# Patient Record
Sex: FEMALE | Race: BLACK OR AFRICAN AMERICAN | NOT HISPANIC OR LATINO | Employment: FULL TIME | URBAN - METROPOLITAN AREA
[De-identification: names, ages, dates, MRNs, and addresses within clinical notes are randomized per-mention and may not be internally consistent; named-entity substitution may affect disease eponyms.]

---

## 2021-08-08 LAB
ANION GAP SERPL CALCULATED.3IONS-SCNC: 10 MMOL/L (ref 10–20)
BASOPHILS # BLD: 0.02 X10E9/L (ref 0–0.1)
BASOPHILS RELATIVE PERCENT: 0.4 % (ref 0–2)
BICARBONATE: 26 MMOL/L (ref 21–32)
CALCIUM SERPL-MCNC: 8.1 MG/DL (ref 8.6–10.3)
CHLORIDE BLD-SCNC: 105 MMOL/L (ref 98–107)
CREAT SERPL-MCNC: 0.75 MG/DL (ref 0.5–1)
DATE OF SYMPTOM ONSET: NORMAL
EOSINOPHIL # BLD: 0.11 X10E9/L (ref 0–0.7)
EOSINOPHILS RELATIVE PERCENT: 2.1 % (ref 0–6)
ERYTHROCYTE [DISTWIDTH] IN BLOOD BY AUTOMATED COUNT: 15.9 % (ref 11.5–14)
GFR AFRICAN AMERICAN: >60 ML/MIN/1.73M2
GFR NON-AFRICAN AMERICAN: >60 ML/MIN/1.73M2
GLUCOSE: 84 MG/DL (ref 74–99)
HCT VFR BLD CALC: 31.7 % (ref 36–46)
HEMOGLOBIN: 9.8 G/DL (ref 12–16)
IMMATURE GRANULOCYTES %: 0.2 % (ref 0–0.9)
INR BLD: 1.2 (ref 0.9–1.1)
LYMPHOCYTES # BLD: 45.5 % (ref 13–44)
LYMPHOCYTES RELATIVE PERCENT: 2.42 X10E9/L (ref 1.2–4.8)
Lab: 1875 NG/ML FEU
MCHC RBC AUTO-ENTMCNC: 30.9 G/DL (ref 32–36)
MCV RBC AUTO: 76 FL (ref 80–100)
MONOCYTES # BLD: 0.34 X10E9/L (ref 0.1–1)
MONOCYTES RELATIVE PERCENT: 6.4 % (ref 2–10)
NEUTROPHILS RELATIVE PERCENT: 45.4 % (ref 40–80)
NEUTROPHILS: 2.42 X10E9/L (ref 1.2–7.7)
PLATELET # BLD: 337 X10E9/L (ref 150–450)
POTASSIUM SERPL-SCNC: 3.6 MMOL/L (ref 3.5–5.3)
PROTHROMBIN TIME: 14.2 SEC (ref 10.1–13)
RBC # BLD: 4.17 X10E12/L (ref 4–5.2)
SARS-COV-2, PCR: NOT DETECTED
SODIUM BLD-SCNC: 137 MMOL/L (ref 136–145)
SPECIMEN SOURCE: NORMAL
TROPONIN I: <0.02 NG/ML (ref 0–0)
UREA NITROGEN: 12 MG/DL (ref 6–23)
WBC: 5.3 X10E9/L (ref 4.4–11.3)

## 2023-03-05 PROBLEM — H69.93 EUSTACHIAN TUBE DYSFUNCTION, BILATERAL: Status: ACTIVE | Noted: 2023-03-05

## 2023-03-05 PROBLEM — H83.2X2 VESTIBULAR HYPOFUNCTION OF LEFT EAR: Status: ACTIVE | Noted: 2023-03-05

## 2023-03-05 PROBLEM — R51.9 HEADACHE: Status: ACTIVE | Noted: 2023-03-05

## 2023-03-05 PROBLEM — R09.82 POST-NASAL DRAINAGE: Status: ACTIVE | Noted: 2023-03-05

## 2023-03-05 PROBLEM — J30.9 ALLERGIC RHINITIS: Status: ACTIVE | Noted: 2023-03-05

## 2023-03-05 PROBLEM — D64.9 ANEMIA: Status: ACTIVE | Noted: 2023-03-05

## 2023-03-05 PROBLEM — E66.9 OBESITY (BMI 30-39.9): Status: ACTIVE | Noted: 2023-03-05

## 2023-03-05 PROBLEM — E87.6 HYPOKALEMIA: Status: ACTIVE | Noted: 2023-03-05

## 2023-03-05 PROBLEM — R39.9 UTI SYMPTOMS: Status: ACTIVE | Noted: 2023-03-05

## 2023-03-05 PROBLEM — J45.40 ASTHMA, MODERATE PERSISTENT (HHS-HCC): Status: ACTIVE | Noted: 2023-03-05

## 2023-03-05 PROBLEM — I31.39 PERICARDIAL EFFUSION (HHS-HCC): Status: ACTIVE | Noted: 2023-03-05

## 2023-03-05 PROBLEM — R42 DIZZINESS: Status: ACTIVE | Noted: 2023-03-05

## 2023-03-05 PROBLEM — M54.2 CERVICALGIA: Status: ACTIVE | Noted: 2023-03-05

## 2023-03-05 PROBLEM — K30 INDIGESTION: Status: ACTIVE | Noted: 2023-03-05

## 2023-03-05 PROBLEM — T78.1XXA ADVERSE FOOD REACTION: Status: ACTIVE | Noted: 2023-03-05

## 2023-03-05 PROBLEM — K21.9 GERD (GASTROESOPHAGEAL REFLUX DISEASE): Status: ACTIVE | Noted: 2023-03-05

## 2023-03-05 PROBLEM — Z97.5 IUD CONTRACEPTION: Status: ACTIVE | Noted: 2023-03-05

## 2023-03-05 PROBLEM — T78.40XA ALLERGIES: Status: ACTIVE | Noted: 2023-03-05

## 2023-03-05 PROBLEM — M54.81 OCCIPITAL NEURALGIA OF RIGHT SIDE: Status: ACTIVE | Noted: 2023-03-05

## 2023-03-05 PROBLEM — G43.E09 CHRONIC MIGRAINE WITH AURA: Status: ACTIVE | Noted: 2023-03-05

## 2023-03-05 PROBLEM — J34.3 HYPERTROPHY OF BOTH INFERIOR NASAL TURBINATES: Status: ACTIVE | Noted: 2023-03-05

## 2023-03-05 PROBLEM — R10.13 DYSPEPSIA: Status: ACTIVE | Noted: 2023-03-05

## 2023-03-05 PROBLEM — R63.2 POLYPHAGIA: Status: ACTIVE | Noted: 2023-03-05

## 2023-03-05 PROBLEM — H92.09 OTALGIA: Status: ACTIVE | Noted: 2023-03-05

## 2023-03-05 PROBLEM — J31.0 CHRONIC RHINITIS: Status: ACTIVE | Noted: 2023-03-05

## 2023-03-05 PROBLEM — R12 HEARTBURN: Status: ACTIVE | Noted: 2023-03-05

## 2023-03-05 PROBLEM — E88.810 METABOLIC SYNDROME: Status: ACTIVE | Noted: 2023-03-05

## 2023-03-05 PROBLEM — H92.09 OTALGIA: Status: RESOLVED | Noted: 2023-03-05 | Resolved: 2023-03-05

## 2023-03-05 PROBLEM — N39.0 UTI (URINARY TRACT INFECTION): Status: ACTIVE | Noted: 2023-03-05

## 2023-03-05 PROBLEM — J34.2 NASAL SEPTAL DEVIATION: Status: ACTIVE | Noted: 2023-03-05

## 2023-03-05 PROBLEM — F41.1 ANXIETY STATE (NEUROTIC): Status: ACTIVE | Noted: 2023-03-05

## 2023-03-05 PROBLEM — R73.03 PREDIABETES: Status: ACTIVE | Noted: 2023-03-05

## 2023-03-05 PROBLEM — R06.83 SNORING: Status: ACTIVE | Noted: 2023-03-05

## 2023-03-05 PROBLEM — R10.13 EPIGASTRIC PAIN: Status: ACTIVE | Noted: 2023-03-05

## 2023-03-05 PROBLEM — H92.03 OTALGIA OF BOTH EARS: Status: ACTIVE | Noted: 2023-03-05

## 2023-03-05 PROBLEM — H81.392 OTHER PERIPHERAL VERTIGO, LEFT EAR: Status: ACTIVE | Noted: 2023-03-05

## 2023-03-05 RX ORDER — AZELASTINE 1 MG/ML
2 SPRAY, METERED NASAL 2 TIMES DAILY
COMMUNITY
Start: 2020-12-08 | End: 2023-07-10 | Stop reason: ALTCHOICE

## 2023-03-05 RX ORDER — FLUTICASONE PROPIONATE AND SALMETEROL 250; 50 UG/1; UG/1
1 POWDER RESPIRATORY (INHALATION) 2 TIMES DAILY
COMMUNITY
Start: 2022-09-26 | End: 2024-04-05 | Stop reason: SDUPTHER

## 2023-03-05 RX ORDER — FREMANEZUMAB-VFRM 225 MG/1.5ML
225 INJECTION SUBCUTANEOUS
COMMUNITY
End: 2023-07-10 | Stop reason: ALTCHOICE

## 2023-03-05 RX ORDER — TRIAMCINOLONE ACETONIDE 1 MG/G
1 OINTMENT TOPICAL
COMMUNITY
End: 2023-07-10 | Stop reason: ALTCHOICE

## 2023-03-05 RX ORDER — DIHYDROERGOTAMINE MESYLATE 4 MG/ML
1 SPRAY, METERED NASAL DAILY
COMMUNITY
End: 2024-02-14 | Stop reason: WASHOUT

## 2023-03-05 RX ORDER — BUDESONIDE AND FORMOTEROL FUMARATE DIHYDRATE 160; 4.5 UG/1; UG/1
1 AEROSOL RESPIRATORY (INHALATION)
COMMUNITY
Start: 2022-08-17 | End: 2024-04-05 | Stop reason: SDUPTHER

## 2023-03-05 RX ORDER — TRETINOIN 1 MG/G
1 CREAM TOPICAL NIGHTLY
COMMUNITY
Start: 2020-06-18

## 2023-03-05 RX ORDER — DULAGLUTIDE 1.5 MG/.5ML
1.5 INJECTION, SOLUTION SUBCUTANEOUS
COMMUNITY
End: 2024-04-16 | Stop reason: WASHOUT

## 2023-03-05 RX ORDER — IPRATROPIUM BROMIDE 17 UG/1
2 AEROSOL, METERED RESPIRATORY (INHALATION) 4 TIMES DAILY
COMMUNITY
Start: 2022-07-10 | End: 2024-05-15 | Stop reason: SDUPTHER

## 2023-03-05 RX ORDER — OMEPRAZOLE 40 MG/1
1 CAPSULE, DELAYED RELEASE ORAL 2 TIMES DAILY
COMMUNITY
Start: 2022-10-05 | End: 2023-07-10 | Stop reason: ALTCHOICE

## 2023-03-05 RX ORDER — ALBUTEROL SULFATE 90 UG/1
2 AEROSOL, METERED RESPIRATORY (INHALATION) EVERY 4 HOURS PRN
COMMUNITY
End: 2024-04-05 | Stop reason: SDUPTHER

## 2023-03-05 RX ORDER — IPRATROPIUM BROMIDE AND ALBUTEROL SULFATE 2.5; .5 MG/3ML; MG/3ML
3 SOLUTION RESPIRATORY (INHALATION)
COMMUNITY
Start: 2021-08-25

## 2023-03-05 RX ORDER — RIZATRIPTAN BENZOATE 10 MG/1
1 TABLET, ORALLY DISINTEGRATING ORAL AS NEEDED
COMMUNITY
Start: 2020-12-09

## 2023-03-06 ENCOUNTER — APPOINTMENT (OUTPATIENT)
Dept: PRIMARY CARE | Facility: CLINIC | Age: 33
End: 2023-03-06
Payer: MEDICAID

## 2023-03-07 ENCOUNTER — APPOINTMENT (OUTPATIENT)
Dept: PRIMARY CARE | Facility: CLINIC | Age: 33
End: 2023-03-07
Payer: MEDICAID

## 2023-03-09 ENCOUNTER — APPOINTMENT (OUTPATIENT)
Dept: PRIMARY CARE | Facility: CLINIC | Age: 33
End: 2023-03-09
Payer: MEDICAID

## 2023-03-13 ENCOUNTER — TELEPHONE (OUTPATIENT)
Dept: PRIMARY CARE | Facility: CLINIC | Age: 33
End: 2023-03-13

## 2023-03-14 LAB
ALLERGEN ANIMAL: CAT DANDER IGE (KU/L): <0.1 KU/L
ALLERGEN ANIMAL: DOG DANDER IGE (KU/L): <0.1 KU/L
ALLERGEN GRASS: BERMUDA GRASS (CYNODON DACTYLON) IGE (KU/L): <0.1 KU/L
ALLERGEN GRASS: JOHNSON GRASS (SORGHUM HALEPENSE) IGE (KU/L): <0.1 KU/L
ALLERGEN GRASS: MEADOW GRASS, KENTUCKY BLUE (POA PRATENSIS )IGE (KU/L): <0.1 KU/L
ALLERGEN GRASS: TIMOTHY GRASS (PHLEUM PRATENSE) IGE (KU/L): <0.1 KU/L
ALLERGEN INSECT: COCKROACH IGE: <0.1 KU/L
ALLERGEN MICROORGANISM: ALTERNARIA ALTERNATA IGE (KU/L): <0.1 KU/L
ALLERGEN MICROORGANISM: ASPERGILLUS FUMIGATUS IGE (KU/L): <0.1 KU/L
ALLERGEN MICROORGANISM: CLADOSPORIUM HERBARUM IGE (KU/L): <0.1 KU/L
ALLERGEN MICROORGANISM: PENICILLIUM CHRYSOGENUM (P. NOTATUM) IGE (KU/L): <0.1 KU/L
ALLERGEN MITE: DERMATOPHAGOIDES FARINAE (HOUSE DUST MITE) IGE (KU/L): <0.1 KU/L
ALLERGEN MITE: DERMATOPHAGOIDES PTERONYSSINUS (HOUSE DUST MITE) IGE (KU/L): <0.1 KU/L
ALLERGEN TREE: BOX-ELDER (ACER NEGUNDO) IGE (KU/L): <0.1 KU/L
ALLERGEN TREE: COMMON SILVER BIRCH (BETULA VERRUCOSA) IGE (KU/L): <0.1 KU/L
ALLERGEN TREE: COTTONWOOD (POPULUS DELTOIDES) IGE (KU/L): <0.1 KU/L
ALLERGEN TREE: ELM (ULMUS AMERICANA) IGE (KU/L): <0.1 KU/L
ALLERGEN TREE: MAPLE LEAF SYCAMORE, LONDON PLANE IGE (KU/L): <0.1 KU/L
ALLERGEN TREE: MOUNTAIN JUNIPER (JUNIPERUS SABINOIDES) IGE (KU/L): 0.14 KU/L
ALLERGEN TREE: MULBERRY (MORUS ALBA) IGE (KU/L): <0.1 KU/L
ALLERGEN TREE: OAK (QUERCUS ALBA) IGE (KU/L): <0.1 KU/L
ALLERGEN TREE: PECAN, HICKORY (CARYA PECAN) IGE (KU/L): <0.1 KU/L
ALLERGEN TREE: WALNUT IGE: <0.1 KU/L
ALLERGEN TREE: WHITE ASH (FRAXINUS AMERICANA) IGE (KU/L): <0.1 KU/L
ALLERGEN WEED: COMMON PIGWEED (AMARANTHUS RETROFLEXUS) IGE (KU/L): <0.1 KU/L
ALLERGEN WEED: COMMON RAGWEED (AMB. ARTEMISIIFOLIA/A. ELATIOR) IGE (KU/L): 2.76 KU/L
ALLERGEN WEED: GOOSEFOOT, LAMB'S QUARTERS (CHENOPODIUM ALBUM) IGE (KU/L): <0.1 KU/L
ALLERGEN WEED: PLANTAIN (ENGLISH), RIBWORT (PLANTAGO LANCEOLATA) IGE (KU/L): <0.1 KU/L
ALLERGEN WEED: PRICKLY SALTWORT/RUSSIAN THISTLE (SALSOLA KALI) IGE (KU/L): <0.1 KU/L
ALLERGEN WEED: SHEEP SORREL (RUMEX ACETOSELLA) IGE (KU/L): <0.1 KU/L
IMMUNOCAP IGE: 14.9 KU/L (ref 0–214)
IMMUNOCAP INTERPRETATION: ABNORMAL

## 2023-03-16 LAB
IMMUNOCAP INTERPRETATION (ARUP): NORMAL
Lab: <0.1 KU/L

## 2023-03-18 LAB
MISCELLANEUOUS TEST RESULT: NORMAL
NAME OF SENDOUT TEST: NORMAL

## 2023-04-13 ENCOUNTER — APPOINTMENT (OUTPATIENT)
Dept: PRIMARY CARE | Facility: CLINIC | Age: 33
End: 2023-04-13
Payer: MEDICAID

## 2023-05-04 ENCOUNTER — TELEPHONE (OUTPATIENT)
Dept: PRIMARY CARE | Facility: CLINIC | Age: 33
End: 2023-05-04
Payer: MEDICAID

## 2023-05-04 DIAGNOSIS — R51.9 NONINTRACTABLE HEADACHE, UNSPECIFIED CHRONICITY PATTERN, UNSPECIFIED HEADACHE TYPE: Primary | ICD-10-CM

## 2023-05-04 NOTE — TELEPHONE ENCOUNTER
Patient states she tested positive for Covid 19 today. Patient would would like to get medications called into her pharmacy.

## 2023-05-11 ENCOUNTER — APPOINTMENT (OUTPATIENT)
Dept: PRIMARY CARE | Facility: CLINIC | Age: 33
End: 2023-05-11
Payer: MEDICAID

## 2023-05-11 DIAGNOSIS — E88.810 METABOLIC SYNDROME: Primary | ICD-10-CM

## 2023-05-16 ENCOUNTER — LAB (OUTPATIENT)
Dept: LAB | Facility: LAB | Age: 33
End: 2023-05-16
Payer: MEDICAID

## 2023-05-16 DIAGNOSIS — E88.810 METABOLIC SYNDROME: ICD-10-CM

## 2023-05-16 LAB
ANION GAP IN SER/PLAS: 12 MMOL/L (ref 10–20)
CALCIUM (MG/DL) IN SER/PLAS: 9.2 MG/DL (ref 8.6–10.6)
CARBON DIOXIDE, TOTAL (MMOL/L) IN SER/PLAS: 27 MMOL/L (ref 21–32)
CHLORIDE (MMOL/L) IN SER/PLAS: 105 MMOL/L (ref 98–107)
CHOLESTEROL (MG/DL) IN SER/PLAS: 184 MG/DL (ref 0–199)
CHOLESTEROL IN HDL (MG/DL) IN SER/PLAS: 43.5 MG/DL
CHOLESTEROL/HDL RATIO: 4.2
CREATININE (MG/DL) IN SER/PLAS: 0.79 MG/DL (ref 0.5–1.05)
ESTIMATED AVERAGE GLUCOSE FOR HBA1C: 108 MG/DL
GFR FEMALE: >90 ML/MIN/1.73M2
GLUCOSE (MG/DL) IN SER/PLAS: 82 MG/DL (ref 74–99)
HEMOGLOBIN A1C/HEMOGLOBIN TOTAL IN BLOOD: 5.4 %
LDL: 124 MG/DL (ref 0–99)
POTASSIUM (MMOL/L) IN SER/PLAS: 3.8 MMOL/L (ref 3.5–5.3)
SODIUM (MMOL/L) IN SER/PLAS: 140 MMOL/L (ref 136–145)
TRIGLYCERIDE (MG/DL) IN SER/PLAS: 84 MG/DL (ref 0–149)
UREA NITROGEN (MG/DL) IN SER/PLAS: 12 MG/DL (ref 6–23)
VLDL: 17 MG/DL (ref 0–40)

## 2023-05-16 PROCEDURE — 83036 HEMOGLOBIN GLYCOSYLATED A1C: CPT

## 2023-05-16 PROCEDURE — 80048 BASIC METABOLIC PNL TOTAL CA: CPT

## 2023-05-16 PROCEDURE — 36415 COLL VENOUS BLD VENIPUNCTURE: CPT

## 2023-05-16 PROCEDURE — 80061 LIPID PANEL: CPT

## 2023-06-29 ENCOUNTER — TELEPHONE (OUTPATIENT)
Dept: PRIMARY CARE | Facility: CLINIC | Age: 33
End: 2023-06-29

## 2023-06-29 NOTE — TELEPHONE ENCOUNTER
Patient is requesting a script for Zyrtec for allergies she stated she's having sinus pressure and headaches.

## 2023-07-10 ENCOUNTER — OFFICE VISIT (OUTPATIENT)
Dept: PRIMARY CARE | Facility: CLINIC | Age: 33
End: 2023-07-10
Payer: MEDICAID

## 2023-07-10 VITALS
BODY MASS INDEX: 36.72 KG/M2 | WEIGHT: 194.47 LBS | HEART RATE: 104 BPM | OXYGEN SATURATION: 99 % | TEMPERATURE: 98 F | HEIGHT: 61 IN | DIASTOLIC BLOOD PRESSURE: 88 MMHG | SYSTOLIC BLOOD PRESSURE: 128 MMHG

## 2023-07-10 DIAGNOSIS — J34.2 NASAL SEPTAL DEVIATION: ICD-10-CM

## 2023-07-10 DIAGNOSIS — R22.1 NECK MASS: Primary | ICD-10-CM

## 2023-07-10 DIAGNOSIS — Z71.2 ENCOUNTER TO DISCUSS TEST RESULTS: ICD-10-CM

## 2023-07-10 DIAGNOSIS — J30.9 ALLERGIC RHINITIS, UNSPECIFIED SEASONALITY, UNSPECIFIED TRIGGER: ICD-10-CM

## 2023-07-10 PROBLEM — H92.09 OTALGIA: Status: ACTIVE | Noted: 2023-07-10

## 2023-07-10 PROBLEM — S16.1XXA CERVICAL STRAIN: Status: ACTIVE | Noted: 2018-05-03

## 2023-07-10 PROBLEM — G43.909 MIGRAINE: Status: ACTIVE | Noted: 2023-03-05

## 2023-07-10 PROBLEM — R29.3 POOR POSTURE: Status: ACTIVE | Noted: 2023-07-10

## 2023-07-10 PROBLEM — R20.2 NUMBNESS AND TINGLING OF LEFT SIDE OF FACE: Status: ACTIVE | Noted: 2021-12-07
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
## 2023-07-10 PROBLEM — B34.9 NONSPECIFIC SYNDROME SUGGESTIVE OF VIRAL ILLNESS: Status: ACTIVE | Noted: 2023-07-10

## 2023-07-10 PROBLEM — O09.899: Status: ACTIVE | Noted: 2017-10-18

## 2023-07-10 PROBLEM — R20.0 NUMBNESS AND TINGLING OF LEFT SIDE OF FACE: Status: ACTIVE | Noted: 2021-12-07

## 2023-07-10 PROBLEM — H69.90 EUSTACHIAN TUBE DYSFUNCTION: Status: ACTIVE | Noted: 2023-07-10

## 2023-07-10 PROBLEM — G43.709 CHRONIC MIGRAINE W/O AURA, NOT INTRACTABLE, W/O STAT MIGR: Status: ACTIVE | Noted: 2022-03-30

## 2023-07-10 PROBLEM — E66.9 OBESITY, CLASS II, BMI 35-39.9: Status: ACTIVE | Noted: 2021-07-21

## 2023-07-10 PROBLEM — O09.93 SUPERVISION OF HIGH RISK PREGNANCY IN THIRD TRIMESTER (HHS-HCC): Status: ACTIVE | Noted: 2017-10-18

## 2023-07-10 PROBLEM — E66.812 OBESITY, CLASS II, BMI 35-39.9: Status: ACTIVE | Noted: 2021-07-21

## 2023-07-10 PROCEDURE — 1036F TOBACCO NON-USER: CPT | Performed by: INTERNAL MEDICINE

## 2023-07-10 PROCEDURE — 99213 OFFICE O/P EST LOW 20 MIN: CPT | Performed by: INTERNAL MEDICINE

## 2023-07-10 RX ORDER — BENZOYL PEROXIDE 50 MG/ML
LIQUID TOPICAL
COMMUNITY

## 2023-07-10 RX ORDER — FLUTICASONE PROPIONATE 50 MCG
1 SPRAY, SUSPENSION (ML) NASAL DAILY
COMMUNITY
Start: 2023-05-27 | End: 2023-07-10 | Stop reason: SDUPTHER

## 2023-07-10 RX ORDER — KETOCONAZOLE 20 MG/ML
SHAMPOO, SUSPENSION TOPICAL
COMMUNITY
Start: 2023-04-19

## 2023-07-10 RX ORDER — DICYCLOMINE HYDROCHLORIDE 20 MG/1
1 TABLET ORAL 4 TIMES DAILY
COMMUNITY
Start: 2019-02-12 | End: 2023-07-10 | Stop reason: ALTCHOICE

## 2023-07-10 RX ORDER — MECLIZINE HCL 12.5 MG 12.5 MG/1
1 TABLET ORAL
COMMUNITY
Start: 2022-09-27

## 2023-07-10 RX ORDER — IRON PS COMPLEX/B12/FOLIC ACID 150-25-1
CAPSULE ORAL
COMMUNITY
Start: 2021-04-15 | End: 2024-04-05 | Stop reason: SDUPTHER

## 2023-07-10 RX ORDER — TRANEXAMIC ACID 650 MG/1
1300 TABLET ORAL 3 TIMES DAILY
COMMUNITY
Start: 2019-11-27

## 2023-07-10 RX ORDER — CLINDAMYCIN PHOSPHATE 11.9 MG/ML
SOLUTION TOPICAL
COMMUNITY
Start: 2019-02-26 | End: 2024-04-05 | Stop reason: SDUPTHER

## 2023-07-10 RX ORDER — ACETAMINOPHEN 500 MG
500 TABLET ORAL EVERY 6 HOURS PRN
COMMUNITY
Start: 2018-11-23

## 2023-07-10 RX ORDER — FLUTICASONE PROPIONATE 50 MCG
1 SPRAY, SUSPENSION (ML) NASAL DAILY
Qty: 16 G | Refills: 1 | Status: SHIPPED | OUTPATIENT
Start: 2023-07-10 | End: 2024-04-05 | Stop reason: SDUPTHER

## 2023-07-10 RX ORDER — CLOBETASOL PROPIONATE 0.5 MG/G
OINTMENT TOPICAL
COMMUNITY
Start: 2023-04-19

## 2023-07-10 RX ORDER — HYDROCORTISONE 25 MG/G
OINTMENT TOPICAL
COMMUNITY
Start: 2019-05-10

## 2023-07-10 ASSESSMENT — ENCOUNTER SYMPTOMS
SORE THROAT: 0
PALPITATIONS: 0
FEVER: 0
FATIGUE: 0
TROUBLE SWALLOWING: 0
WHEEZING: 0
RHINORRHEA: 0
FACIAL SWELLING: 0
COUGH: 0
CHILLS: 0
VOICE CHANGE: 0
PSYCHIATRIC NEGATIVE: 1
HEADACHES: 0
CHOKING: 0
VOMITING: 0
NAUSEA: 0
ARTHRALGIAS: 0
SHORTNESS OF BREATH: 0

## 2023-07-10 NOTE — PATIENT INSTRUCTIONS
Alberto was seen today for labs only.  Diagnoses and all orders for this visit:  Neck mass (Primary)  Comments:  No erythema or tenderness present of examination  Orders:  -     CT soft tissue neck w and wo IV contrast; Future  -     Referral to ENT; Future  Nasal septal deviation  Comments:  medication refill requested  Allergic rhinitis, unspecified seasonality, unspecified trigger  Comments:  medication refill requested  Orders:  -     fluticasone (Flonase) 50 mcg/actuation nasal spray; Administer 1 spray into each nostril once daily. shake liquid  Encounter to discuss test results  Comments:  Requested lab results reviewed discussed. LDL cholesterol was mildly elevated  Plan: Regular exercise  -Cholesterol lowering diet  -Continue weight reduction

## 2023-07-10 NOTE — PROGRESS NOTES
Subjective   Patient ID: Alberto Gerard is a 33 y.o. female who presents for Labs Only (Knot on neck).  The patient is a 32YO female who states that she noticed a non-tender lump on the back of her neck appx 2 mths ago. There has been no change in size of the mass.  She is also requesting refill of her RX for her nasal spray.        Review of Systems   Constitutional:  Negative for chills, fatigue and fever.   HENT:  Negative for congestion, ear discharge, facial swelling, rhinorrhea, sore throat, trouble swallowing and voice change.    Respiratory:  Negative for cough, choking, shortness of breath and wheezing.    Cardiovascular:  Negative for chest pain, palpitations and leg swelling.   Gastrointestinal:  Negative for nausea and vomiting.   Musculoskeletal:  Negative for arthralgias.   Neurological:  Negative for headaches (Hx of Migraine headache. No current SXs.).   Psychiatric/Behavioral: Negative.         Objective   Physical Exam  Vitals reviewed.   Constitutional:       General: She is not in acute distress.     Appearance: She is not ill-appearing.   HENT:      Mouth/Throat:      Mouth: Mucous membranes are moist.      Pharynx: Oropharynx is clear.   Neck:      Comments: There is an appx 3cm non-tender mass of the posterior neck area. No erythema.  Cardiovascular:      Rate and Rhythm: Regular rhythm.      Heart sounds: Normal heart sounds.   Pulmonary:      Effort: Pulmonary effort is normal.      Breath sounds: Normal breath sounds.   Abdominal:      General: Bowel sounds are normal.      Palpations: Abdomen is soft.   Musculoskeletal:      Cervical back: Normal range of motion and neck supple. No tenderness.   Lymphadenopathy:      Cervical: No cervical adenopathy.   Neurological:      Mental Status: She is alert.         Assessment/Plan   Alberto was seen today for labs only.  Diagnoses and all orders for this visit:  Neck mass (Primary)  Comments:  No erythema or tenderness present of  examination  Orders:  -     CT soft tissue neck w and wo IV contrast; Future  -     Referral to ENT; Future  Nasal septal deviation  Comments:  medication refill requested  Allergic rhinitis, unspecified seasonality, unspecified trigger  Comments:  medication refill requested  Orders:  -     fluticasone (Flonase) 50 mcg/actuation nasal spray; Administer 1 spray into each nostril once daily. shake liquid  Encounter to discuss test results  Comments:  Requested lab results reviewed discussed. LDL cholesterol was mildly elevated  Plan: Regular exercise  -Cholesterol lowering diet  -Continue weight reduction

## 2023-08-04 ENCOUNTER — TELEPHONE (OUTPATIENT)
Dept: PRIMARY CARE | Facility: CLINIC | Age: 33
End: 2023-08-04

## 2023-08-04 NOTE — TELEPHONE ENCOUNTER
Radiology called and requested a new order be sent in for the following:  Soft tissue and neck CT    Please place order and advise

## 2023-08-23 ENCOUNTER — OFFICE VISIT (OUTPATIENT)
Dept: PRIMARY CARE | Facility: CLINIC | Age: 33
End: 2023-08-23
Payer: MEDICAID

## 2023-08-23 VITALS
HEART RATE: 96 BPM | BODY MASS INDEX: 36.27 KG/M2 | HEIGHT: 61 IN | RESPIRATION RATE: 16 BRPM | SYSTOLIC BLOOD PRESSURE: 98 MMHG | TEMPERATURE: 97.6 F | WEIGHT: 192.1 LBS | DIASTOLIC BLOOD PRESSURE: 78 MMHG

## 2023-08-23 DIAGNOSIS — M54.41 BILATERAL LOW BACK PAIN WITH BILATERAL SCIATICA, UNSPECIFIED CHRONICITY: Primary | ICD-10-CM

## 2023-08-23 DIAGNOSIS — M54.42 BILATERAL LOW BACK PAIN WITH BILATERAL SCIATICA, UNSPECIFIED CHRONICITY: Primary | ICD-10-CM

## 2023-08-23 PROBLEM — E87.6 HYPOKALEMIA: Status: RESOLVED | Noted: 2023-03-05 | Resolved: 2023-08-23

## 2023-08-23 PROCEDURE — 1036F TOBACCO NON-USER: CPT | Performed by: INTERNAL MEDICINE

## 2023-08-23 PROCEDURE — 99214 OFFICE O/P EST MOD 30 MIN: CPT | Performed by: INTERNAL MEDICINE

## 2023-08-23 RX ORDER — IBUPROFEN 600 MG/1
600 TABLET ORAL 3 TIMES DAILY PRN
Qty: 90 TABLET | Refills: 0 | Status: SHIPPED | OUTPATIENT
Start: 2023-08-23 | End: 2023-11-21

## 2023-08-23 RX ORDER — IBUPROFEN 200 MG
TABLET ORAL
COMMUNITY
Start: 2023-04-15

## 2023-08-23 RX ORDER — GALCANEZUMAB 120 MG/ML
INJECTION, SOLUTION SUBCUTANEOUS
COMMUNITY
Start: 2023-08-03 | End: 2024-02-14 | Stop reason: WASHOUT

## 2023-08-23 ASSESSMENT — LIFESTYLE VARIABLES
HOW MANY STANDARD DRINKS CONTAINING ALCOHOL DO YOU HAVE ON A TYPICAL DAY: 1 OR 2
AUDIT-C TOTAL SCORE: 1
HOW OFTEN DO YOU HAVE SIX OR MORE DRINKS ON ONE OCCASION: NEVER
HOW OFTEN DO YOU HAVE A DRINK CONTAINING ALCOHOL: MONTHLY OR LESS
SKIP TO QUESTIONS 9-10: 1

## 2023-08-23 ASSESSMENT — ENCOUNTER SYMPTOMS
DYSURIA: 0
ABDOMINAL PAIN: 0
DIZZINESS: 0
HEADACHES: 0
NUMBNESS: 0
COUGH: 0
SHORTNESS OF BREATH: 0
BACK PAIN: 1
CONSTITUTIONAL NEGATIVE: 1
JOINT SWELLING: 0
WHEEZING: 0

## 2023-08-23 ASSESSMENT — PATIENT HEALTH QUESTIONNAIRE - PHQ9
SUM OF ALL RESPONSES TO PHQ9 QUESTIONS 1 & 2: 0
1. LITTLE INTEREST OR PLEASURE IN DOING THINGS: NOT AT ALL
2. FEELING DOWN, DEPRESSED OR HOPELESS: NOT AT ALL

## 2023-08-23 NOTE — PATIENT INSTRUCTIONS
Assessment/Plan   Alberto was seen today for leg pain.  Diagnoses and all orders for this visit:  Bilateral low back pain with bilateral sciatica, unspecified chronicity (Primary)  Comments:  There were no neurological deficits present on exam.  Orders:  -     XR lumbar spine 4+ views w flexion extension; Future  -     Referral to Medical Spine; Future  -     ibuprofen 600 mg tablet; Take 1 tablet (600 mg) by mouth 3 times a day as needed for mild pain (1 - 3) (pain. Take with food.).     Call or return for evaluation if symptoms worsen or fail to improve.

## 2023-08-23 NOTE — PROGRESS NOTES
Subjective   Patient ID: Alberto Gerard is a 33 y.o. female who presents for Leg Pain (Patient has achy, burning pain in both legs for 3 weeks.).  Was evaluated in [facility] 3 mths ago for pain in both legs. Her evaluation for DVT was negative.  Her current symptoms began appx three weeks ago and consists of low back pain with intermittent radicular pain to both lower extremities alternately.  Occasionally the pain radiates to both lower extremities at the same time. She denies extremity weakness or numbness, bladder or bowel incontinence.         Review of Systems   Constitutional: Negative.    HENT: Negative.     Respiratory:  Negative for cough, shortness of breath and wheezing.    Cardiovascular:  Negative for chest pain and leg swelling.   Gastrointestinal:  Negative for abdominal pain.   Genitourinary:  Negative for dysuria.   Musculoskeletal:  Positive for back pain. Negative for joint swelling.   Neurological:  Negative for dizziness, numbness and headaches.       Objective   Physical Exam  Vitals reviewed.   Constitutional:       Appearance: Normal appearance. She is obese.   Cardiovascular:      Rate and Rhythm: Regular rhythm.      Heart sounds: Normal heart sounds.   Pulmonary:      Effort: Pulmonary effort is normal.      Breath sounds: Normal breath sounds.   Abdominal:      General: Bowel sounds are normal.      Palpations: Abdomen is soft.   Musculoskeletal:      Cervical back: Normal range of motion and neck supple.   Neurological:      General: No focal deficit present.      Mental Status: She is alert and oriented to person, place, and time.      Sensory: No sensory deficit.      Motor: No weakness.      Gait: Gait normal.      Deep Tendon Reflexes: Reflexes normal.   Psychiatric:         Mood and Affect: Mood normal.         Behavior: Behavior normal.         Assessment/Plan   Alberto was seen today for leg pain.  Diagnoses and all orders for this visit:  Bilateral low back pain with bilateral  sciatica, unspecified chronicity (Primary)  Comments:  There were no neurological deficits present on exam.  Orders:  -     XR lumbar spine 4+ views w flexion extension; Future  -     Referral to Medical Spine; Future  -     ibuprofen 600 mg tablet; Take 1 tablet (600 mg) by mouth 3 times a day as needed for mild pain (1 - 3) (pain. Take with food.).     Call or return for evaluation if symptoms worsen or fail to improve.

## 2023-09-11 RX ORDER — METOPROLOL TARTRATE 25 MG/1
1-2 TABLET, FILM COATED ORAL EVERY 6 HOURS PRN
COMMUNITY
Start: 2023-09-08

## 2023-10-25 ENCOUNTER — TELEPHONE (OUTPATIENT)
Dept: CARDIOLOGY | Facility: CLINIC | Age: 33
End: 2023-10-25
Payer: MEDICAID

## 2023-10-25 DIAGNOSIS — I31.39 PERICARDIAL EFFUSION (HHS-HCC): Primary | ICD-10-CM

## 2023-10-25 DIAGNOSIS — I47.11 INAPPROPRIATE SINUS TACHYCARDIA (CMS-HCC): ICD-10-CM

## 2023-10-25 NOTE — TELEPHONE ENCOUNTER
Pt called in stating she has been experiencing Tachycardia for the last 3 days and she would like to know if she could do a stress test per your recommendation if she was still experiencing symptoms.    LOV 9/2023    Luna Villarreal MA

## 2023-11-01 NOTE — TELEPHONE ENCOUNTER
Call and let them know to schedule stress test  Usual follow up for testing.  (* 1/2 weeks after completion

## 2023-11-24 ENCOUNTER — APPOINTMENT (OUTPATIENT)
Dept: CARDIOLOGY | Facility: HOSPITAL | Age: 33
End: 2023-11-24
Payer: MEDICAID

## 2023-12-20 ENCOUNTER — HOSPITAL ENCOUNTER (OUTPATIENT)
Dept: CARDIOLOGY | Facility: CLINIC | Age: 33
Discharge: HOME | End: 2023-12-20
Payer: MEDICAID

## 2023-12-20 VITALS — SYSTOLIC BLOOD PRESSURE: 128 MMHG | DIASTOLIC BLOOD PRESSURE: 76 MMHG

## 2023-12-20 DIAGNOSIS — I31.39 PERICARDIAL EFFUSION (HHS-HCC): ICD-10-CM

## 2023-12-20 DIAGNOSIS — I47.11 INAPPROPRIATE SINUS TACHYCARDIA (CMS-HCC): ICD-10-CM

## 2023-12-20 DIAGNOSIS — R00.0 TACHYCARDIA, UNSPECIFIED: ICD-10-CM

## 2023-12-20 PROCEDURE — 93350 STRESS TTE ONLY: CPT

## 2023-12-20 PROCEDURE — 93016 CV STRESS TEST SUPVJ ONLY: CPT | Performed by: INTERNAL MEDICINE

## 2023-12-20 PROCEDURE — 93350 STRESS TTE ONLY: CPT | Performed by: INTERNAL MEDICINE

## 2023-12-20 PROCEDURE — 93018 CV STRESS TEST I&R ONLY: CPT | Performed by: INTERNAL MEDICINE

## 2024-02-14 ENCOUNTER — TELEMEDICINE (OUTPATIENT)
Dept: DERMATOLOGY | Facility: CLINIC | Age: 34
End: 2024-02-14
Payer: MEDICAID

## 2024-02-14 DIAGNOSIS — L70.0 ACNE VULGARIS: Primary | ICD-10-CM

## 2024-02-14 PROCEDURE — 99204 OFFICE O/P NEW MOD 45 MIN: CPT | Performed by: NURSE PRACTITIONER

## 2024-02-14 PROCEDURE — 1036F TOBACCO NON-USER: CPT | Performed by: NURSE PRACTITIONER

## 2024-02-14 ASSESSMENT — PATIENT GLOBAL ASSESSMENT (PGA): WHAT IS THE PGA: PATIENT GLOBAL ASSESSMENT:  4 - SEVERE

## 2024-02-14 ASSESSMENT — DERMATOLOGY QUALITY OF LIFE (QOL) ASSESSMENT
RATE HOW EMOTIONALLY BOTHERED YOU ARE BY YOUR SKIN PROBLEM (FOR EXAMPLE, WORRY, EMBARRASSMENT, FRUSTRATION): 3
RATE HOW BOTHERED YOU ARE BY EFFECTS OF YOUR SKIN PROBLEMS ON YOUR ACTIVITIES (EG, GOING OUT, ACCOMPLISHING WHAT YOU WANT, WORK ACTIVITIES OR YOUR RELATIONSHIPS WITH OTHERS): 3
RATE HOW BOTHERED YOU ARE BY EFFECTS OF YOUR SKIN PROBLEMS ON YOUR ACTIVITIES (EG, GOING OUT, ACCOMPLISHING WHAT YOU WANT, WORK ACTIVITIES OR YOUR RELATIONSHIPS WITH OTHERS): 3
RATE HOW BOTHERED YOU ARE BY SYMPTOMS OF YOUR SKIN PROBLEM (EG, ITCHING, STINGING BURNING, HURTING OR SKIN IRRITATION): 6 - ALWAYS BOTHERED
RATE HOW EMOTIONALLY BOTHERED YOU ARE BY YOUR SKIN PROBLEM (FOR EXAMPLE, WORRY, EMBARRASSMENT, FRUSTRATION): 3
RATE HOW BOTHERED YOU ARE BY SYMPTOMS OF YOUR SKIN PROBLEM (EG, ITCHING, STINGING BURNING, HURTING OR SKIN IRRITATION): 6 - ALWAYS BOTHERED
WHAT SINGLE SKIN CONDITION LISTED BELOW IS THE PATIENT ANSWERING THE QUALITY-OF-LIFE ASSESSMENT QUESTIONS ABOUT: ACNE
WHAT SINGLE SKIN CONDITION LISTED BELOW IS THE PATIENT ANSWERING THE QUALITY-OF-LIFE ASSESSMENT QUESTIONS ABOUT: ACNE

## 2024-03-13 ENCOUNTER — LAB (OUTPATIENT)
Dept: LAB | Facility: LAB | Age: 34
End: 2024-03-13
Payer: MEDICAID

## 2024-03-13 ENCOUNTER — TELEMEDICINE (OUTPATIENT)
Dept: DERMATOLOGY | Facility: CLINIC | Age: 34
End: 2024-03-13
Payer: MEDICAID

## 2024-03-13 DIAGNOSIS — L70.0 ACNE VULGARIS: ICD-10-CM

## 2024-03-13 DIAGNOSIS — L70.0 ACNE VULGARIS: Primary | ICD-10-CM

## 2024-03-13 LAB
ALBUMIN SERPL BCP-MCNC: 4 G/DL (ref 3.4–5)
ALP SERPL-CCNC: 52 U/L (ref 33–110)
ALT SERPL W P-5'-P-CCNC: 9 U/L (ref 7–45)
ANION GAP SERPL CALC-SCNC: 13 MMOL/L (ref 10–20)
AST SERPL W P-5'-P-CCNC: 12 U/L (ref 9–39)
B-HCG SERPL-ACNC: <3 MIU/ML
BILIRUB SERPL-MCNC: 0.3 MG/DL (ref 0–1.2)
BUN SERPL-MCNC: 13 MG/DL (ref 6–23)
CALCIUM SERPL-MCNC: 9.2 MG/DL (ref 8.6–10.6)
CHLORIDE SERPL-SCNC: 104 MMOL/L (ref 98–107)
CHOLEST SERPL-MCNC: 193 MG/DL (ref 0–199)
CHOLESTEROL/HDL RATIO: 3.7
CO2 SERPL-SCNC: 28 MMOL/L (ref 21–32)
CREAT SERPL-MCNC: 0.75 MG/DL (ref 0.5–1.05)
EGFRCR SERPLBLD CKD-EPI 2021: >90 ML/MIN/1.73M*2
GLUCOSE SERPL-MCNC: 77 MG/DL (ref 74–99)
HDLC SERPL-MCNC: 52.7 MG/DL
LDLC SERPL CALC-MCNC: 129 MG/DL
NON HDL CHOLESTEROL: 140 MG/DL (ref 0–149)
POTASSIUM SERPL-SCNC: 3.6 MMOL/L (ref 3.5–5.3)
PROT SERPL-MCNC: 7.2 G/DL (ref 6.4–8.2)
SODIUM SERPL-SCNC: 141 MMOL/L (ref 136–145)
TRIGL SERPL-MCNC: 59 MG/DL (ref 0–149)
VLDL: 12 MG/DL (ref 0–40)

## 2024-03-13 PROCEDURE — 99213 OFFICE O/P EST LOW 20 MIN: CPT | Performed by: NURSE PRACTITIONER

## 2024-03-13 PROCEDURE — 1036F TOBACCO NON-USER: CPT | Performed by: NURSE PRACTITIONER

## 2024-03-13 PROCEDURE — 36415 COLL VENOUS BLD VENIPUNCTURE: CPT

## 2024-03-13 PROCEDURE — 84702 CHORIONIC GONADOTROPIN TEST: CPT

## 2024-03-13 PROCEDURE — 80061 LIPID PANEL: CPT

## 2024-03-13 PROCEDURE — 80053 COMPREHEN METABOLIC PANEL: CPT

## 2024-03-13 ASSESSMENT — DERMATOLOGY QUALITY OF LIFE (QOL) ASSESSMENT
RATE HOW EMOTIONALLY BOTHERED YOU ARE BY YOUR SKIN PROBLEM (FOR EXAMPLE, WORRY, EMBARRASSMENT, FRUSTRATION): 2
RATE HOW BOTHERED YOU ARE BY EFFECTS OF YOUR SKIN PROBLEMS ON YOUR ACTIVITIES (EG, GOING OUT, ACCOMPLISHING WHAT YOU WANT, WORK ACTIVITIES OR YOUR RELATIONSHIPS WITH OTHERS): 1
RATE HOW BOTHERED YOU ARE BY SYMPTOMS OF YOUR SKIN PROBLEM (EG, ITCHING, STINGING BURNING, HURTING OR SKIN IRRITATION): 6 - ALWAYS BOTHERED
RATE HOW EMOTIONALLY BOTHERED YOU ARE BY YOUR SKIN PROBLEM (FOR EXAMPLE, WORRY, EMBARRASSMENT, FRUSTRATION): 2
WHAT SINGLE SKIN CONDITION LISTED BELOW IS THE PATIENT ANSWERING THE QUALITY-OF-LIFE ASSESSMENT QUESTIONS ABOUT: ACNE
WHAT SINGLE SKIN CONDITION LISTED BELOW IS THE PATIENT ANSWERING THE QUALITY-OF-LIFE ASSESSMENT QUESTIONS ABOUT: ACNE
RATE HOW BOTHERED YOU ARE BY SYMPTOMS OF YOUR SKIN PROBLEM (EG, ITCHING, STINGING BURNING, HURTING OR SKIN IRRITATION): 6 - ALWAYS BOTHERED
RATE HOW BOTHERED YOU ARE BY EFFECTS OF YOUR SKIN PROBLEMS ON YOUR ACTIVITIES (EG, GOING OUT, ACCOMPLISHING WHAT YOU WANT, WORK ACTIVITIES OR YOUR RELATIONSHIPS WITH OTHERS): 1

## 2024-03-13 ASSESSMENT — PATIENT GLOBAL ASSESSMENT (PGA): WHAT IS THE PGA: PATIENT GLOBAL ASSESSMENT:  3 - MODERATE

## 2024-03-13 NOTE — PROGRESS NOTES
Subjective     Alberto Gerard is a 33 y.o. female who presents for the following: Acne.     Established patient return for acne visit last seen one month ago and instructed to follow up in 1 month for Accutane start. From Epic chart patient did not obtain labs.     Review of Systems:  No other skin or systemic complaints other than what is documented elsewhere in the note.    The following portions of the chart were reviewed this encounter and updated as appropriate:       Skin Cancer History  No skin cancer on file.    Specialty Problems    None    Past Medical History:  Alberto Gerard  has a past medical history of COVID-19 (07/22/2022), High risk heterosexual behavior (09/18/2015), Other specified cough (08/15/2022), Other specified disorders of nose and nasal sinuses (12/04/2020), Personal history of other mental and behavioral disorders (11/06/2019), Personal history of other specified conditions (05/24/2022), and Personal history of pneumonia (recurrent) (08/05/2022).    Past Surgical History:  Alberto Gerard  has a past surgical history that includes Other surgical history (09/29/2015).    Family History:  Patient family history includes Diabetes in her mother; cardiac disorder in an other family member.    Social History:  Alberto Gerard  reports that she has never smoked. She has never used smokeless tobacco. She reports that she does not currently use alcohol after a past usage of about 1.0 standard drink of alcohol per week. She reports that she does not use drugs.    Allergies:  Morphine and Codeine    Current Medications / CAM's:    Current Outpatient Medications:     acetaminophen (Tylenol) 500 mg tablet, Take 1 tablet (500 mg) by mouth every 6 hours if needed., Disp: , Rfl:     albuterol 90 mcg/actuation inhaler, Inhale 2 puffs every 4 hours if needed (asthma)., Disp: , Rfl:     benzoyl peroxide 5 % external wash, WASH FACE DAILY, Disp: , Rfl:     budesonide-formoteroL (Symbicort)  160-4.5 mcg/actuation inhaler, Inhale 1 puff 2 times a day., Disp: , Rfl:     clindamycin (Cleocin T) 1 % external solution, SONNY TO FACE ONCE D IN THE MORNING, Disp: , Rfl:     clobetasol (Temovate) 0.05 % ointment, APPLY TO SCALP DAILY MONDAY THROUGH FRIDAY, Disp: , Rfl:     dulaglutide (Trulicity) 1.5 mg/0.5 mL pen injector, Inject 1.5 mg under the skin 1 (one) time per week., Disp: , Rfl:     fluticasone (Flonase) 50 mcg/actuation nasal spray, Administer 1 spray into each nostril once daily. shake liquid, Disp: 16 g, Rfl: 1    fluticasone propion-salmeteroL (Advair Diskus) 250-50 mcg/dose diskus inhaler, Inhale 1 puff 2 times a day., Disp: , Rfl:     glucose 4 gram chewable tablet, CHEW AND SWALLOW 4 TABLETS BY MOUTH ONCE AS NEEDED FOR DIZZINESS, Disp: , Rfl:     hydrocortisone 2.5 % ointment, APPLY TO FACE BID MONDAY THROUGH FRIDAY PRN, Disp: , Rfl:     ipratropium (Atrovent HFA) 17 mcg/actuation inhaler, Inhale 2 puffs 4 times a day., Disp: , Rfl:     ipratropium-albuteroL (Duo-Neb) 0.5-2.5 mg/3 mL nebulizer solution, Take 3 mL by nebulization 3 times a day., Disp: , Rfl:     ketoconazole (NIZOral) 2 % shampoo, APPLY TO SCALP ONCE EVERY OTHER WEEK - LEAVE ON FOR 10 TO 15 MINUTES THEN RINSE OUT, Disp: , Rfl:     meclizine (Antivert) 12.5 mg tablet, Take 1 tablet (12.5 mg) by mouth every 6 hours during the day., Disp: , Rfl:     metoprolol tartrate (Lopressor) 25 mg tablet, Take 1-2 tablets (25-50 mg) by mouth every 6 hours if needed (for break through palpitations)., Disp: , Rfl:     Poly-Iron 150 Forte 150-25-1 mg-mcg-mg capsule, TAKE 1 CAPSULE BY MOUTH DAILY FOR ANEMIA, Disp: , Rfl:     rizatriptan MLT (Maxalt-MLT) 10 mg disintegrating tablet, Take 1 tablet (10 mg) by mouth if needed (headache). At onset. May repeat every 2 hours as needed. Maximum 3 tablets in 24 hours, Disp: , Rfl:     tranexamic acid (Lysteda) 650 mg tablet tablet, Take 2 tablets (1,300 mg) by mouth 3 times a day., Disp: , Rfl:      tretinoin (Retin-A) 0.1 % cream, Apply 1 Application topically once daily at bedtime., Disp: , Rfl:      Objective   Well appearing patient in no apparent distress; mood and affect are within normal limits.    Assessment/Plan   1. Acne vulgaris  Head - Anterior (Face)  Scattered comedones and inflammatory papulopustules.    PLAN:  Patient was previously ordered labs to start isotretinoin including hCG, triglycerides, LFTs.  Patient states she is on unable to get the labs given her busy work schedule the lab is closed by the time she is able to go.  Ultimately she does not want to choose additional acne treatment and would prefer to start isotretinoin  We had a long discussion reminding patient that this is a requirement for being on this medication and that labs would need to be done monthly through the duration of this treatment course.  Patient verbalized understanding  Lab hours for [facility] laboratory is 7 AM to 6 PM so patient was encouraged to go before work  Patient will attempt to have labs drawn in the next few days.  She is aware that we will prompt registration iPLEDGE with start of isotretinoin to be in an additional 30 days

## 2024-04-04 PROCEDURE — RXMED WILLOW AMBULATORY MEDICATION CHARGE

## 2024-04-05 ENCOUNTER — OFFICE VISIT (OUTPATIENT)
Dept: PRIMARY CARE | Facility: CLINIC | Age: 34
End: 2024-04-05
Payer: MEDICAID

## 2024-04-05 VITALS
HEART RATE: 97 BPM | WEIGHT: 192 LBS | BODY MASS INDEX: 36.28 KG/M2 | DIASTOLIC BLOOD PRESSURE: 86 MMHG | OXYGEN SATURATION: 97 % | SYSTOLIC BLOOD PRESSURE: 123 MMHG

## 2024-04-05 DIAGNOSIS — J01.01 ACUTE RECURRENT MAXILLARY SINUSITIS: Primary | ICD-10-CM

## 2024-04-05 DIAGNOSIS — B37.31 YEAST VAGINITIS: ICD-10-CM

## 2024-04-05 DIAGNOSIS — G43.909 MIGRAINE WITHOUT STATUS MIGRAINOSUS, NOT INTRACTABLE, UNSPECIFIED MIGRAINE TYPE: ICD-10-CM

## 2024-04-05 DIAGNOSIS — J45.20 MILD INTERMITTENT ASTHMA WITHOUT COMPLICATION (HHS-HCC): ICD-10-CM

## 2024-04-05 DIAGNOSIS — R73.03 PREDIABETES: ICD-10-CM

## 2024-04-05 DIAGNOSIS — J30.9 ALLERGIC RHINITIS, UNSPECIFIED SEASONALITY, UNSPECIFIED TRIGGER: ICD-10-CM

## 2024-04-05 DIAGNOSIS — D64.9 ANEMIA, UNSPECIFIED TYPE: ICD-10-CM

## 2024-04-05 DIAGNOSIS — E66.9 OBESITY, CLASS II, BMI 35-39.9: ICD-10-CM

## 2024-04-05 DIAGNOSIS — L70.0 ACNE VULGARIS: ICD-10-CM

## 2024-04-05 PROCEDURE — 99214 OFFICE O/P EST MOD 30 MIN: CPT | Performed by: NURSE PRACTITIONER

## 2024-04-05 PROCEDURE — 1036F TOBACCO NON-USER: CPT | Performed by: NURSE PRACTITIONER

## 2024-04-05 RX ORDER — IBUPROFEN 800 MG/1
TABLET ORAL
Qty: 90 TABLET | Refills: 1 | Status: SHIPPED | OUTPATIENT
Start: 2024-04-05

## 2024-04-05 RX ORDER — GUAIFENESIN 600 MG/1
1200 TABLET, EXTENDED RELEASE ORAL 2 TIMES DAILY
Qty: 60 TABLET | Refills: 2 | Status: SHIPPED | OUTPATIENT
Start: 2024-04-05

## 2024-04-05 RX ORDER — GUAIFENESIN 600 MG/1
1200 TABLET, EXTENDED RELEASE ORAL 2 TIMES DAILY
COMMUNITY
End: 2024-04-05 | Stop reason: SDUPTHER

## 2024-04-05 RX ORDER — IRON PS COMPLEX/B12/FOLIC ACID 150-25-1
CAPSULE ORAL
Qty: 100 CAPSULE | Refills: 1 | Status: SHIPPED | OUTPATIENT
Start: 2024-04-05

## 2024-04-05 RX ORDER — CETIRIZINE HYDROCHLORIDE 10 MG/1
10 TABLET ORAL DAILY
Qty: 30 TABLET | Refills: 5 | Status: SHIPPED | OUTPATIENT
Start: 2024-04-05 | End: 2024-10-02

## 2024-04-05 RX ORDER — FLUTICASONE PROPIONATE 50 MCG
1 SPRAY, SUSPENSION (ML) NASAL DAILY
Qty: 16 G | Refills: 2 | Status: SHIPPED | OUTPATIENT
Start: 2024-04-05

## 2024-04-05 RX ORDER — AMOXICILLIN 875 MG/1
875 TABLET, FILM COATED ORAL 2 TIMES DAILY
Qty: 20 TABLET | Refills: 0 | Status: SHIPPED | OUTPATIENT
Start: 2024-04-05 | End: 2024-04-15

## 2024-04-05 RX ORDER — FLUCONAZOLE 150 MG/1
150 TABLET ORAL ONCE
Qty: 1 TABLET | Refills: 0 | Status: SHIPPED | OUTPATIENT
Start: 2024-04-05 | End: 2024-04-05

## 2024-04-05 RX ORDER — ALBUTEROL SULFATE 90 UG/1
2 AEROSOL, METERED RESPIRATORY (INHALATION) EVERY 4 HOURS PRN
Qty: 18 G | Refills: 1 | Status: SHIPPED | OUTPATIENT
Start: 2024-04-05

## 2024-04-05 RX ORDER — FLUTICASONE PROPIONATE AND SALMETEROL 250; 50 UG/1; UG/1
POWDER RESPIRATORY (INHALATION)
Qty: 60 EACH | Refills: 3 | Status: SHIPPED | OUTPATIENT
Start: 2024-04-05

## 2024-04-05 RX ORDER — CLINDAMYCIN PHOSPHATE 11.9 MG/ML
SOLUTION TOPICAL DAILY
Qty: 60 ML | Refills: 3 | Status: SHIPPED | OUTPATIENT
Start: 2024-04-05

## 2024-04-05 NOTE — PATIENT INSTRUCTIONS
At [facility] they sell mucus relief     If you have any shortness of breath   Use a peak flow meter to see how well you are breath   I think your symptoms are due to sinusitis than anemia   You can use iron to help the anemia    You may need prednisone for asthma if the shortness of breath does not improve   Follow up in 3 months   You can be referred to pharmacy for migraine medication   You may need a preventative medication for migraines like topirimate   There is calm ana find ways to detox to relax from work

## 2024-04-05 NOTE — LETTER
April 5, 2024     Patient: Alberto Gerard   YOB: 1990   Date of Visit: 4/5/2024       To Whom It May Concern:    Alberto Gerard was seen in my clinic on 4/5/2024 at 11:40 am. Please excuse Alberto for her absence from work on 4/5/2024 and  4/8/2024 due to a medical issue . She may return to work on 4/9/2024 .    If you have any questions or concerns, please don't hesitate to call.         Sincerely,         Delores Salgado, THALIA-CNP        CC: No Recipients

## 2024-04-05 NOTE — PROGRESS NOTES
Subjective   Patient ID: Alberto Gerard is a 33 y.o. female who presents for Anemia, fluid behind the ear, and Facial Pain.    HPI   The patient has had shortness of breath , has asthma she is not coughing   She does not have inhalers   She has fluid in the right eustachian tube  She has had headaches like migraine x 1 week got worse Tuesday and yesterday , she took Excedrin and it was relieved   She was given fluticasone for allergies in the past   She has been sneezing  She does not have facial hair   She thinks her mother has hypertension  She did not get so much sleep due to being in pain    She has an iud and is not at risk for pregnancy   She needs a btw slip she works at the [employer]     Review of Systems Negative except what was mentioned in the HPI       Objective   /86   Pulse 97   Wt 87.1 kg (192 lb)   SpO2 97%   BMI 36.28 kg/m²   acne  Physical Exam  Vitals and nursing note reviewed.   Constitutional:       Appearance: Normal appearance.   HENT:      Head: Normocephalic and atraumatic.      Comments: Positive sinus tenderness      Right Ear: Tympanic membrane normal.      Left Ear: Tympanic membrane normal.      Nose: Congestion present.      Mouth/Throat:      Mouth: Mucous membranes are moist.   Eyes:      Extraocular Movements: Extraocular movements intact.      Conjunctiva/sclera: Conjunctivae normal.   Cardiovascular:      Rate and Rhythm: Normal rate and regular rhythm.      Pulses: Normal pulses.      Heart sounds: Normal heart sounds.   Pulmonary:      Effort: Pulmonary effort is normal.      Breath sounds: Normal breath sounds.   Abdominal:      General: Abdomen is flat. Bowel sounds are normal.      Palpations: Abdomen is soft.   Musculoskeletal:         General: Normal range of motion.      Cervical back: Normal range of motion and neck supple.   Skin:     General: Skin is warm and dry.      Findings: Rash present.      Comments: Acne lesions    Neurological:      General: No  focal deficit present.      Mental Status: She is alert and oriented to person, place, and time. Mental status is at baseline.   Psychiatric:         Mood and Affect: Mood normal.         Behavior: Behavior normal.         Thought Content: Thought content normal.         Judgment: Judgment normal.         Assessment/Plan   Problem List Items Addressed This Visit             ICD-10-CM    Allergic rhinitis J30.9    Relevant Medications    guaiFENesin (Mucinex) 600 mg 12 hr tablet    cetirizine (ZyrTEC) 10 mg tablet    fluticasone (Flonase) 50 mcg/actuation nasal spray    Anemia D64.9    Relevant Medications    Poly-Iron 150 Forte 150-25-1 mg-mcg-mg capsule    Migraine G43.909    Relevant Medications    ibuprofen 800 mg tablet    Prediabetes R73.03    Relevant Orders    Comprehensive Metabolic Panel    Hemoglobin A1C    Cholesterol, LDL Direct    Albumin, urine, random     Other Visit Diagnoses         Codes    Acute recurrent maxillary sinusitis    -  Primary J01.01    Relevant Medications    guaiFENesin (Mucinex) 600 mg 12 hr tablet    Mild intermittent asthma without complication ([facility])     J45.20    currently not  stable she is symptomatic , inhalers ordered     Relevant Medications    albuterol 90 mcg/actuation inhaler    fluticasone propion-salmeteroL (Advair Diskus) 250-50 mcg/dose diskus inhaler    Acne vulgaris     L70.0    Relevant Medications    clindamycin (Cleocin T) 1 % external solution    Yeast vaginitis     B37.31

## 2024-04-08 ENCOUNTER — TELEMEDICINE (OUTPATIENT)
Dept: NEUROLOGY | Facility: CLINIC | Age: 34
End: 2024-04-08
Payer: MEDICAID

## 2024-04-08 DIAGNOSIS — L70.0 ACNE VULGARIS: ICD-10-CM

## 2024-04-08 DIAGNOSIS — G43.709 CHRONIC MIGRAINE W/O AURA, NOT INTRACTABLE, W/O STAT MIGR: Primary | ICD-10-CM

## 2024-04-08 PROCEDURE — 99214 OFFICE O/P EST MOD 30 MIN: CPT | Performed by: NURSE PRACTITIONER

## 2024-04-08 NOTE — PROGRESS NOTES
Being assessed today for follow-up of migraine.  Since the last time she had saw Cristopher we she was not having very frequent migraines so she was managing with her abortive treatment.  Over the last 2 months she started to experience increased activity which is 4 days/week and they last for hours to the day depending on when she is able to utilize her abortive treatment.  If she does not use the abortive treatment right away then it does not work, otherwise they are both effective.  In the past she has tried beta-blocker, SSRI, Ajovy, Emgality, Aimovig all of which have been ineffective or caused side effects.  For preventative treatment would like to try her on Nurtec every other day.  Okay to continue the rizatriptan and Trudhesa for abortive treatment.  Discussed role of medicine, importance of taking medications, potential risks, benefits, and precautions to be taken.  Reviewed sleep hygiene and dietary modifications.  Follow-up in 2 to 3 months after starting the Nurtec.    This note was created with voice recognition software and was not corrected for typographical or grammatical errors

## 2024-04-10 ENCOUNTER — PHARMACY VISIT (OUTPATIENT)
Dept: PHARMACY | Facility: CLINIC | Age: 34
End: 2024-04-10
Payer: MEDICAID

## 2024-04-15 ENCOUNTER — APPOINTMENT (OUTPATIENT)
Dept: DERMATOLOGY | Facility: CLINIC | Age: 34
End: 2024-04-15
Payer: MEDICAID

## 2024-04-16 ENCOUNTER — APPOINTMENT (OUTPATIENT)
Dept: DERMATOLOGY | Facility: CLINIC | Age: 34
End: 2024-04-16
Payer: MEDICAID

## 2024-04-22 ENCOUNTER — APPOINTMENT (OUTPATIENT)
Dept: NEUROLOGY | Facility: CLINIC | Age: 34
End: 2024-04-22
Payer: MEDICAID

## 2024-05-13 ENCOUNTER — LAB (OUTPATIENT)
Dept: LAB | Facility: LAB | Age: 34
End: 2024-05-13
Payer: MEDICAID

## 2024-05-13 DIAGNOSIS — R73.03 PREDIABETES: ICD-10-CM

## 2024-05-13 LAB
ALBUMIN SERPL BCP-MCNC: 3.7 G/DL (ref 3.4–5)
ALP SERPL-CCNC: 49 U/L (ref 33–110)
ALT SERPL W P-5'-P-CCNC: 7 U/L (ref 7–45)
ANION GAP SERPL CALC-SCNC: 13 MMOL/L (ref 10–20)
AST SERPL W P-5'-P-CCNC: 8 U/L (ref 9–39)
BILIRUB SERPL-MCNC: 0.2 MG/DL (ref 0–1.2)
BUN SERPL-MCNC: 14 MG/DL (ref 6–23)
CALCIUM SERPL-MCNC: 8.7 MG/DL (ref 8.6–10.6)
CHLORIDE SERPL-SCNC: 104 MMOL/L (ref 98–107)
CO2 SERPL-SCNC: 26 MMOL/L (ref 21–32)
CREAT SERPL-MCNC: 0.74 MG/DL (ref 0.5–1.05)
EGFRCR SERPLBLD CKD-EPI 2021: >90 ML/MIN/1.73M*2
GLUCOSE SERPL-MCNC: 85 MG/DL (ref 74–99)
LDLC SERPL DIRECT ASSAY-MCNC: 115 MG/DL (ref 0–129)
POTASSIUM SERPL-SCNC: 3.9 MMOL/L (ref 3.5–5.3)
PROT SERPL-MCNC: 7 G/DL (ref 6.4–8.2)
SODIUM SERPL-SCNC: 139 MMOL/L (ref 136–145)

## 2024-05-13 PROCEDURE — 82570 ASSAY OF URINE CREATININE: CPT

## 2024-05-13 PROCEDURE — 82043 UR ALBUMIN QUANTITATIVE: CPT

## 2024-05-13 PROCEDURE — 83036 HEMOGLOBIN GLYCOSYLATED A1C: CPT

## 2024-05-13 PROCEDURE — 83721 ASSAY OF BLOOD LIPOPROTEIN: CPT

## 2024-05-13 PROCEDURE — 36415 COLL VENOUS BLD VENIPUNCTURE: CPT

## 2024-05-13 PROCEDURE — 80053 COMPREHEN METABOLIC PANEL: CPT

## 2024-05-14 LAB
CREAT UR-MCNC: 201.6 MG/DL (ref 20–320)
EST. AVERAGE GLUCOSE BLD GHB EST-MCNC: 114 MG/DL
HBA1C MFR BLD: 5.6 %
MICROALBUMIN UR-MCNC: <7 MG/L
MICROALBUMIN/CREAT UR: NORMAL MG/G{CREAT}

## 2024-05-14 PROCEDURE — RXMED WILLOW AMBULATORY MEDICATION CHARGE

## 2024-05-15 ENCOUNTER — PHARMACY VISIT (OUTPATIENT)
Dept: PHARMACY | Facility: CLINIC | Age: 34
End: 2024-05-15
Payer: MEDICAID

## 2024-05-15 ENCOUNTER — OFFICE VISIT (OUTPATIENT)
Dept: PRIMARY CARE | Facility: CLINIC | Age: 34
End: 2024-05-15
Payer: MEDICAID

## 2024-05-15 VITALS
BODY MASS INDEX: 34.89 KG/M2 | HEIGHT: 62 IN | HEART RATE: 96 BPM | TEMPERATURE: 98 F | SYSTOLIC BLOOD PRESSURE: 122 MMHG | RESPIRATION RATE: 12 BRPM | OXYGEN SATURATION: 99 % | WEIGHT: 189.6 LBS | DIASTOLIC BLOOD PRESSURE: 70 MMHG

## 2024-05-15 DIAGNOSIS — R07.89 ATYPICAL CHEST PAIN: ICD-10-CM

## 2024-05-15 DIAGNOSIS — J45.20 MILD INTERMITTENT ASTHMA WITHOUT COMPLICATION (HHS-HCC): Primary | ICD-10-CM

## 2024-05-15 DIAGNOSIS — E66.9 OBESITY (BMI 30.0-34.9): ICD-10-CM

## 2024-05-15 PROCEDURE — 99214 OFFICE O/P EST MOD 30 MIN: CPT | Performed by: NURSE PRACTITIONER

## 2024-05-15 RX ORDER — IPRATROPIUM BROMIDE 17 UG/1
2 AEROSOL, METERED RESPIRATORY (INHALATION)
Qty: 12.9 G | Refills: 1 | Status: SHIPPED | OUTPATIENT
Start: 2024-05-15

## 2024-05-15 RX ORDER — OMEPRAZOLE 40 MG/1
40 CAPSULE, DELAYED RELEASE ORAL
Qty: 30 CAPSULE | Refills: 2 | Status: SHIPPED | OUTPATIENT
Start: 2024-05-15

## 2024-05-15 ASSESSMENT — PATIENT HEALTH QUESTIONNAIRE - PHQ9
SUM OF ALL RESPONSES TO PHQ9 QUESTIONS 1 AND 2: 0
2. FEELING DOWN, DEPRESSED OR HOPELESS: NOT AT ALL
1. LITTLE INTEREST OR PLEASURE IN DOING THINGS: NOT AT ALL

## 2024-05-15 NOTE — PATIENT INSTRUCTIONS
Get protein powder that you like and mix with almond milk   See what the dermatologist   Hydroquinone can take away blemishes but can give you skin irritation   Some people get sun damage   Use fluticasone allergy relief mucus relief   Eply can help   Meclizine  is good to use

## 2024-05-15 NOTE — PROGRESS NOTES
"Subjective   Patient ID: Alberto Gerard is a 33 y.o. female who presents for Follow-up (Follow up- e.r. chest pains).    HPI   The patient has been given nurtec to use for her migraines   Her mother has heart disease she  is overweight , she thinks she has heart failure   Her aunt had a heart attack in their sleep   She is exercises and has no chest pain with that   She went to [facility] the beginning of April with dizziness and nausea .   She had a stress test last year in December which was negative and also an echocardiogram which was negative for heart disease   She has a hx of asthma and needs atrovent inhaler . I discussed that she may have gerd and to use omeprazole when she has the symptoms of atypical chest pain . She is mildly obese with a bmi of 34     Review of Systems Negative except what was mentioned in the HPI       Objective   /70 (Patient Position: Sitting)   Pulse 96   Temp 36.7 °C (98 °F)   Resp 12   Ht 1.575 m (5' 2\")   Wt 86 kg (189 lb 9.6 oz)   SpO2 99%   BMI 34.68 kg/m²     Physical Exam  Vitals and nursing note reviewed.   Constitutional:       Appearance: Normal appearance.   HENT:      Head: Normocephalic and atraumatic.      Right Ear: Tympanic membrane normal.      Left Ear: Tympanic membrane normal.      Nose: Nose normal.      Mouth/Throat:      Mouth: Mucous membranes are moist.   Eyes:      Extraocular Movements: Extraocular movements intact.      Conjunctiva/sclera: Conjunctivae normal.   Cardiovascular:      Rate and Rhythm: Normal rate and regular rhythm.      Pulses: Normal pulses.      Heart sounds: Normal heart sounds.   Pulmonary:      Effort: Pulmonary effort is normal.      Breath sounds: Normal breath sounds.   Abdominal:      General: Abdomen is flat. Bowel sounds are normal.      Palpations: Abdomen is soft.   Musculoskeletal:         General: Normal range of motion.      Cervical back: Normal range of motion and neck supple.   Skin:     General: Skin is warm " and dry.   Neurological:      General: No focal deficit present.      Mental Status: She is alert and oriented to person, place, and time. Mental status is at baseline.   Psychiatric:         Mood and Affect: Mood normal.         Behavior: Behavior normal.         Thought Content: Thought content normal.         Judgment: Judgment normal.     Assessment/Plan   Problem List Items Addressed This Visit    None  Visit Diagnoses         Codes    Mild intermittent asthma without complication ([facility])    -  Primary J45.20    Relevant Medications    ipratropium (Atrovent HFA) 17 mcg/actuation inhaler    Atypical chest pain     R07.89    Relevant Medications    omeprazole (PriLOSEC) 40 mg DR capsule    Obesity (BMI 30.0-34.9)     E66.9

## 2024-06-12 PROCEDURE — RXMED WILLOW AMBULATORY MEDICATION CHARGE

## 2024-06-14 ENCOUNTER — PHARMACY VISIT (OUTPATIENT)
Dept: PHARMACY | Facility: CLINIC | Age: 34
End: 2024-06-14
Payer: MEDICAID

## 2024-07-05 ENCOUNTER — APPOINTMENT (OUTPATIENT)
Dept: PRIMARY CARE | Facility: CLINIC | Age: 34
End: 2024-07-05
Payer: MEDICAID

## 2024-07-18 PROCEDURE — RXMED WILLOW AMBULATORY MEDICATION CHARGE

## 2024-07-23 ENCOUNTER — PHARMACY VISIT (OUTPATIENT)
Dept: PHARMACY | Facility: CLINIC | Age: 34
End: 2024-07-23
Payer: MEDICAID

## 2024-08-20 PROCEDURE — RXMED WILLOW AMBULATORY MEDICATION CHARGE

## 2024-08-22 ENCOUNTER — PHARMACY VISIT (OUTPATIENT)
Dept: PHARMACY | Facility: CLINIC | Age: 34
End: 2024-08-22
Payer: MEDICAID

## 2024-08-26 ENCOUNTER — APPOINTMENT (OUTPATIENT)
Dept: PRIMARY CARE | Facility: CLINIC | Age: 34
End: 2024-08-26
Payer: MEDICAID

## 2024-09-20 PROCEDURE — RXMED WILLOW AMBULATORY MEDICATION CHARGE

## 2024-09-23 ENCOUNTER — PHARMACY VISIT (OUTPATIENT)
Dept: PHARMACY | Facility: CLINIC | Age: 34
End: 2024-09-23
Payer: MEDICAID

## 2024-11-04 ENCOUNTER — APPOINTMENT (OUTPATIENT)
Dept: NEUROLOGY | Facility: CLINIC | Age: 34
End: 2024-11-04
Payer: MEDICAID

## 2024-11-05 ENCOUNTER — TELEMEDICINE (OUTPATIENT)
Dept: NEUROLOGY | Facility: CLINIC | Age: 34
End: 2024-11-05
Payer: MEDICAID

## 2024-11-05 DIAGNOSIS — G43.709 CHRONIC MIGRAINE W/O AURA, NOT INTRACTABLE, W/O STAT MIGR: Primary | ICD-10-CM

## 2024-11-05 PROCEDURE — 99214 OFFICE O/P EST MOD 30 MIN: CPT | Performed by: NURSE PRACTITIONER

## 2024-11-05 RX ORDER — RIZATRIPTAN BENZOATE 10 MG/1
10 TABLET, ORALLY DISINTEGRATING ORAL AS NEEDED
Qty: 9 TABLET | Refills: 3 | Status: SHIPPED | OUTPATIENT
Start: 2024-11-05

## 2024-11-05 NOTE — PROGRESS NOTES
No chief complaint on file.      Subjective   HPI  Alberto Gerard is a 34 y.o. year old female who presents with chief complaint Chronic migraine w/o aura, not intractable, w/o stat migr [G43.709]      Alberto is having  20-23 HA days per month, 20-23 of the HAs meet migraine criteria.   The current rescue medications work within  3    hours and decreased the headache by 75%.  Although, only if she catches it immediately otherwise the rizatriptan is not effective.  Migraine is unilateral.  Endorses nausea.  Endorses photophobia.  Endorses phonophobia.    Current/ past HA treatments:  Preventive:  Metoprolol  Nortriptyline  Aimovig  Emgality  Ajovy  Nurtec    Rescue:  Ibuprofen  Acetaminophen        Current Outpatient Medications:     acetaminophen (Tylenol) 500 mg tablet, Take 1 tablet (500 mg) by mouth every 6 hours if needed., Disp: , Rfl:     albuterol 90 mcg/actuation inhaler, Inhale 2 puffs every 4 hours if needed (asthma)., Disp: 18 g, Rfl: 1    benzoyl peroxide 5 % external wash, WASH FACE DAILY, Disp: , Rfl:     cetirizine (ZyrTEC) 10 mg tablet, Take 1 tablet (10 mg) by mouth once daily., Disp: 30 tablet, Rfl: 5    clindamycin (Cleocin T) 1 % external solution, Apply topically early in the morning.., Disp: 60 mL, Rfl: 3    clobetasol (Temovate) 0.05 % ointment, APPLY TO SCALP DAILY MONDAY THROUGH FRIDAY, Disp: , Rfl:     fluticasone (Flonase) 50 mcg/actuation nasal spray, Administer 1 spray into each nostril once daily. shake liquid, Disp: 16 g, Rfl: 2    fluticasone propion-salmeteroL (Advair Diskus) 250-50 mcg/dose diskus inhaler, Use one puff twice a day for asthma rinse mouth after use, Disp: 60 each, Rfl: 3    glucose 4 gram chewable tablet, CHEW AND SWALLOW 4 TABLETS BY MOUTH ONCE AS NEEDED FOR DIZZINESS, Disp: , Rfl:     guaiFENesin (Mucinex) 600 mg 12 hr tablet, Take 2 tablets (1,200 mg) by mouth 2 times a day. Do not crush, chew, or split., Disp: 60 tablet, Rfl: 2    hydrocortisone 2.5 %  ointment, APPLY TO FACE BID MONDAY THROUGH FRIDAY PRN, Disp: , Rfl:     ibuprofen 800 mg tablet, Take one tablet with food po for headache and menstrual cramps every 8 hours, Disp: 90 tablet, Rfl: 1    ipratropium (Atrovent HFA) 17 mcg/actuation inhaler, Inhale 2 puffs 2 times a day., Disp: 12.9 g, Rfl: 1    ipratropium-albuteroL (Duo-Neb) 0.5-2.5 mg/3 mL nebulizer solution, Take 3 mL by nebulization 3 times a day., Disp: , Rfl:     ketoconazole (NIZOral) 2 % shampoo, APPLY TO SCALP ONCE EVERY OTHER WEEK - LEAVE ON FOR 10 TO 15 MINUTES THEN RINSE OUT, Disp: , Rfl:     meclizine (Antivert) 12.5 mg tablet, Take 1 tablet (12.5 mg) by mouth every 6 hours during the day., Disp: , Rfl:     metoprolol tartrate (Lopressor) 25 mg tablet, Take 1-2 tablets (25-50 mg) by mouth every 6 hours if needed (for break through palpitations)., Disp: , Rfl:     omeprazole (PriLOSEC) 40 mg DR capsule, Take 1 capsule (40 mg) by mouth once daily in the morning. Take before meals. Do not crush or chew., Disp: 30 capsule, Rfl: 2    Poly-Iron 150 Forte 150-25-1 mg-mcg-mg capsule, TAKE 1 CAPSULE BY MOUTH DAILY FOR ANEMIA, Disp: 100 capsule, Rfl: 1    rizatriptan MLT (Maxalt-MLT) 10 mg disintegrating tablet, Take 1 tablet (10 mg) by mouth if needed (headache). At onset. May repeat every 2 hours as needed. Maximum 3 tablets in 24 hours, Disp: 9 tablet, Rfl: 3    tranexamic acid (Lysteda) 650 mg tablet tablet, Take 2 tablets (1,300 mg) by mouth 3 times a day., Disp: , Rfl:     tretinoin (Retin-A) 0.1 % cream, Apply 1 Application topically once daily at bedtime., Disp: , Rfl:     Trulicity 4.5 mg/0.5 mL pen injector, Inject 4.5 mg subcutaneously every week, Disp: 2 mL, Rfl: 2    Past Medical History:   Diagnosis Date    COVID-19 07/22/2022    COVID-19 virus infection    High risk heterosexual behavior 09/18/2015    Unprotected sexual intercourse    Other specified cough 08/15/2022    Post-viral cough syndrome    Other specified disorders of nose  and nasal sinuses 12/04/2020    Sinus pressure    Personal history of other mental and behavioral disorders 11/06/2019    History of attention deficit hyperactivity disorder (ADHD)    Personal history of other specified conditions 05/24/2022    History of dizziness    Personal history of pneumonia (recurrent) 08/05/2022    History of community acquired pneumonia     Past Surgical History:   Procedure Laterality Date    OTHER SURGICAL HISTORY  09/29/2015    Prior Surgical Procedure Not Done     Family History   Problem Relation Name Age of Onset    Diabetes Mother      Other (cardiac disorder) Other Maternal Relatives      Social History     Tobacco Use    Smoking status: Never    Smokeless tobacco: Never   Substance Use Topics    Alcohol use: Not Currently     Alcohol/week: 1.0 standard drink of alcohol     Types: 1 Glasses of wine per week     Social History     Substance and Sexual Activity   Drug Use Never        ROS  As noted in HPI, otherwise all other systems have been reviewed are negative for complaint.     Objective   General Appearance: Alberto is well-developed, well-nourished, 34 y.o. year old female, in no acute distress. Makes good eye contact, is alert, interactive, and cooperative. Demonstrates recent & remote memory recall. Subjective information consistent with objective assessment.     There were no vitals filed for this visit.    Lab Results   Component Value Date    WBC 8.0 04/15/2023    RBC 4.26 04/15/2023    HGB 10.2 (L) 04/15/2023    HCT 32.6 (L) 04/15/2023     04/15/2023     05/13/2024    K 3.9 05/13/2024     05/13/2024    BUN 14 05/13/2024    CREATININE 0.74 05/13/2024    EGFR >90 05/13/2024    CALCIUM 8.7 05/13/2024    ALKPHOS 49 05/13/2024    AST 8 (L) 05/13/2024    ALT 7 05/13/2024    MG 2.10 12/16/2018    [id number] 653 06/17/2021    VITD25 27 (A) 06/17/2021    HGBA1C 5.6 05/13/2024    LDLDIRECT 115 05/13/2024    LDLCALC 129 (H) 03/13/2024    CHOL 193 03/13/2024     HDL 52.7 03/13/2024    TRIG 59 03/13/2024    TSH 0.61 06/17/2021          Assessment & Plan  Chronic migraine w/o aura, not intractable, w/o stat migr    Orders:    rizatriptan MLT (Maxalt-MLT) 10 mg disintegrating tablet; Take 1 tablet (10 mg) by mouth if needed (headache). At onset. May repeat every 2 hours as needed. Maximum 3 tablets in 24 hours         ASSESSMENT/PLAN:  Discontinue Nurtec for preventative treatment.  Continue rizatriptan for abortive treatment.  Start Botox treatments.  Follow-up pending start of Botox.          Laquita Odom, APRN-CNP

## 2024-11-05 NOTE — ASSESSMENT & PLAN NOTE
Orders:    rizatriptan MLT (Maxalt-MLT) 10 mg disintegrating tablet; Take 1 tablet (10 mg) by mouth if needed (headache). At onset. May repeat every 2 hours as needed. Maximum 3 tablets in 24 hours

## 2024-11-06 ENCOUNTER — APPOINTMENT (OUTPATIENT)
Dept: NEUROLOGY | Facility: CLINIC | Age: 34
End: 2024-11-06
Payer: MEDICAID

## 2024-12-24 ENCOUNTER — TELEPHONE (OUTPATIENT)
Dept: NEUROLOGY | Facility: CLINIC | Age: 34
End: 2024-12-24
Payer: MEDICAID

## 2024-12-24 NOTE — TELEPHONE ENCOUNTER
Her Botox was approved.   Botox auth 2 visits 11/20/24-5/20/25 [facility] 11/8/24     Called and left 2 messages for patient to call back and schedule. No response. Has been over a month since first call and 2 weeks since second call.

## 2025-01-23 ENCOUNTER — OFFICE VISIT (OUTPATIENT)
Dept: CARDIOLOGY | Facility: CLINIC | Age: 35
End: 2025-01-23
Payer: MEDICAID

## 2025-01-23 VITALS — BODY MASS INDEX: 36.62 KG/M2 | WEIGHT: 199 LBS | HEIGHT: 62 IN | OXYGEN SATURATION: 100 % | HEART RATE: 88 BPM

## 2025-01-23 DIAGNOSIS — I31.39 PERICARDIAL EFFUSION (HHS-HCC): Primary | ICD-10-CM

## 2025-01-23 DIAGNOSIS — I42.8 OTHER CARDIOMYOPATHY: ICD-10-CM

## 2025-01-23 PROBLEM — I42.9 CARDIOMYOPATHY: Status: ACTIVE | Noted: 2025-01-23

## 2025-01-23 LAB
ATRIAL RATE: 88 BPM
P AXIS: 78 DEGREES
P OFFSET: 193 MS
P ONSET: 147 MS
PR INTERVAL: 154 MS
Q ONSET: 224 MS
QRS COUNT: 14 BEATS
QRS DURATION: 72 MS
QT INTERVAL: 348 MS
QTC CALCULATION(BAZETT): 421 MS
QTC FREDERICIA: 395 MS
R AXIS: 7 DEGREES
T AXIS: 37 DEGREES
T OFFSET: 398 MS
VENTRICULAR RATE: 88 BPM

## 2025-01-23 PROCEDURE — 99214 OFFICE O/P EST MOD 30 MIN: CPT | Performed by: INTERNAL MEDICINE

## 2025-01-23 PROCEDURE — 1036F TOBACCO NON-USER: CPT | Performed by: INTERNAL MEDICINE

## 2025-01-23 PROCEDURE — 93005 ELECTROCARDIOGRAM TRACING: CPT | Performed by: INTERNAL MEDICINE

## 2025-01-23 PROCEDURE — 3008F BODY MASS INDEX DOCD: CPT | Performed by: INTERNAL MEDICINE

## 2025-01-23 RX ORDER — MINOCYCLINE HYDROCHLORIDE 100 MG/1
100 CAPSULE ORAL NIGHTLY
COMMUNITY
Start: 2024-12-06

## 2025-01-23 ASSESSMENT — PAIN SCALES - GENERAL: PAINLEVEL_OUTOF10: 0-NO PAIN

## 2025-01-23 NOTE — PATIENT INSTRUCTIONS
"It was a pleasure seeing you today. I would like to see you back in clinic in 1 to 2 weeks after completion of echocardiogram you can call my office if questions arise between now and our next visit.     Today, we talked about your [facility] presentation as well as your history of pericardial effusion    Will repeat an echocardiogram to reassess your pericardial effusion    Below are some Heart Health Tips that we provide to all of our patients. I hope you fing them useful.     - If you are having problems with medications, consider looking at the following websites.   --  \"GoodRx\"   --  \"[facility]\"      - We are happy to supply written prescriptions if needed to allow you to obtain your medications from different pharmacies. Additionally, if you are having issues with mail order delivery, please let us know. We can send a limited supply of your medications to your local pharmacy.     -  We recommend you follow a heart healthy diet. Watch food labels and try not to eat more than 2,500 mg of sodium per day. Avoid foods high in salt like processed meats (lunch meats, tuttle, and sausage), processed foods (boxed dinners, canned soups), fried and fast foods. Monitor serving sizes and if the sodium per serving size is more than 200 mg, avoid those foods. If the sodium per serving size is between 100-200 mg, you can use those in limited quantities. Try to choose foods where the amount of sodium per serving size is less than 100 mg. Try to eat a diet rich in fruits and vegetables, whole grains, low fat dairy products, skinless poultry and fish, nuts, beans, non-tropical vegetable oils. Limit saturated fat, trans fat, sodium, red meats, and sugar-sweetened beverages.   Limit alcohol     -The combination of a reduced-calorie diet and increased physical activity is recommended. Adults should aim to get at least 150 minutes of moderate physical activity per week (30 minutes of moderate " physical activities at least 5 days per week). Examples of moderate physical activities include brisk walking, swimming, aerobic dancing, heavy gardening, jumping rope, bicycling 10 MPH or faster, tennis, hiking uphill or with a heavy backpack. Please let us know if you would like to learn more about your nutrition and calories and additional options including weight loss programs to help you reach your goal.     -If you smoke, stop smoking. If you stop smoking you can help get rid of a major source of stress to your heart. Smoking makes your heart rate and blood pressure go up and increases your risk or developing cardiovascular diseases and worsen symptoms associated with heart failure.     -Obtain a BP monitor and monitor your BP daily. Check it around the same time each day; at least 1 hour after taking your medications. Record your BP in a log and bring your log with you to your doctors appointment.     -F/u with your PCP as recommended.

## 2025-01-23 NOTE — ASSESSMENT & PLAN NOTE
We will obtain a transthoracic echocardiogram for structural evaluation including ejection fraction, assessment of regional wall motion abnormalities or valvular disease, and further evaluation of hemodynamics.  -- This will help to reassess for re-accumulation / stability of her previously seen pericardial effusion as well as to assess for any interval dilation that could explain CXR findings.   -- Her EKG today does not show electrical alternans

## 2025-01-23 NOTE — ASSESSMENT & PLAN NOTE
We will obtain a transthoracic echocardiogram for structural evaluation including ejection fraction, assessment of regional wall motion abnormalities or valvular disease, and further evaluation of hemodynamics.  -- This will help to reassess for re-accumulation / stability of her previously seen pericardial effusion as well as to assess for any interval dilation that could explain CXR findings.

## 2025-01-23 NOTE — PROGRESS NOTES
"Referred by No ref. provider found for No chief complaint on file.     HPI:    Alberto Gerard is a 34 y.o. female with pertinent history of   has a past medical history of COVID-19 (07/22/2022), High risk heterosexual behavior (09/18/2015), Other specified cough (08/15/2022), Other specified disorders of nose and nasal sinuses (12/04/2020), Personal history of other mental and behavioral disorders (11/06/2019), Personal history of other specified conditions (05/24/2022), and Personal history of pneumonia (recurrent) (08/05/2022). who presents to cardiology clinic to establish care.       08/2023   She had been getting ready to get kids off to school in the AM. she was doing her daughters hair and felt her heart begin to race. States that over the last sveral weeks, she had noted racing heart. it \"lasted for a while\"         Today 9/8/2023-- she presents to establish are after her presentation to [facility]. She had completed a 7 day monitor within the [employer] System. States that she did feel symptoms while wearing the [employer] [employer] Monitor. States that she continues to feel symptoms of palpitations, lasting 5-10 minutes and then abating.         9/26/2023 -- She \"Returns\" for a virtual Telehealth encounter to discuss the results of her studies. This visit was completed via telephone due to the restrictions of the COVID-19 pandemic. All issues as below were discussed and addressed but no physical exam was performed. If it was felt that the patient should be evaluated in clinic then they were directed there. The patient verbally consented to visit.     I spent 6 minutes with patient and/or family on the phone and more than 50% of the time was spent counseling and/or coordinating care.     She reports that she has done well since her last encounter. She reports that she has noticed that her heart palpitations, racing heart chest discomfort dramatically improved. She has not had to use any of her " metoprolol for breakthrough heart palpitations. She notes that she stopped Trulicity and for 1 week and symptoms abated. She will discuss with the ordering provider about other alternative medications.    1/23/2025 -- She present =s for follow up. She has nopt been seen in person in about 18 months.  She reports that 1/17/2025 she had been having some chest discomfort. She went to [facility] where work up was benign with a normal high-sensitivity cardiac troponin despite ongoing symptoms.  Her chest x-ray however was interpreted as cardiomegaly with an enlarged cardiac silhouette on a upright 2 view chest x-ray.  Her EKG also was suggestive of lower voltage.  Given her known history of pericardial effusion, she decided to keep her scheduled appointment for follow-up.        No exacerbating or relieving factors.  Patient denies chest pain and angina.  Pt denies orthopnea, and paroxysmal nocturnal dyspnea.  Pt denies worsening lower extremity edema.  Pt denies palpitations or syncope.  No recent falls.  No fever or chills.  No cough.  No change in bowel or bladder habits.  No sick contacts.  No recent travel.    12 point review of systems including (Constitutional, Eyes, ENMT, Respiratory, Cardiac, Gastrointestinal, Neurological, Psychiatric, and Hematologic) was performed and is otherwise negative.    Past medical history reviewed:   has a past medical history of COVID-19 (07/22/2022), High risk heterosexual behavior (09/18/2015), Other specified cough (08/15/2022), Other specified disorders of nose and nasal sinuses (12/04/2020), Personal history of other mental and behavioral disorders (11/06/2019), Personal history of other specified conditions (05/24/2022), and Personal history of pneumonia (recurrent) (08/05/2022).    Past surgical history reviewed:   has a past surgical history that includes Other surgical history (09/29/2015).    Social history reviewed:   reports that she has never smoked. She has  never used smokeless tobacco. She reports that she does not currently use alcohol after a past usage of about 1.0 standard drink of alcohol per week. She reports that she does not use drugs.     Family history reviewed:    Family History   Problem Relation Name Age of Onset    Diabetes Mother      Other (cardiac disorder) Other Maternal Relatives        Allergies reviewed: Morphine and Codeine     Medications reviewed:   Current Outpatient Medications   Medication Instructions    acetaminophen (TYLENOL) 500 mg, oral, Every 6 hours PRN    albuterol 90 mcg/actuation inhaler 2 puffs, inhalation, Every 4 hours PRN    benzoyl peroxide 5 % external wash WASH FACE DAILY    cetirizine (ZYRTEC) 10 mg, oral, Daily    clindamycin (Cleocin T) 1 % external solution Topical, Daily    clobetasol (Temovate) 0.05 % ointment APPLY TO SCALP DAILY MONDAY THROUGH FRIDAY    fluticasone (Flonase) 50 mcg/actuation nasal spray 1 spray, Each Nostril, Daily, shake liquid    fluticasone propion-salmeteroL (Advair Diskus) 250-50 mcg/dose diskus inhaler Use one puff twice a day for asthma rinse mouth after use    glucose 4 gram chewable tablet CHEW AND SWALLOW 4 TABLETS BY MOUTH ONCE AS NEEDED FOR DIZZINESS    guaiFENesin (MUCINEX) 1,200 mg, oral, 2 times daily, Do not crush, chew, or split.    hydrocortisone 2.5 % ointment APPLY TO FACE BID MONDAY THROUGH FRIDAY PRN    ibuprofen 800 mg tablet Take one tablet with food po for headache and menstrual cramps every 8 hours    ipratropium (Atrovent HFA) 17 mcg/actuation inhaler 2 puffs, inhalation, 2 times daily RT    ipratropium-albuteroL (Duo-Neb) 0.5-2.5 mg/3 mL nebulizer solution 3 mL, nebulization, 3 times daily RT    ketoconazole (NIZOral) 2 % shampoo APPLY TO SCALP ONCE EVERY OTHER WEEK - LEAVE ON FOR 10 TO 15 MINUTES THEN RINSE OUT    meclizine (Antivert) 12.5 mg tablet 1 tablet, oral, Every 6 hours during day    metoprolol tartrate (Lopressor) 25 mg tablet 1-2 tablets, oral, Every 6 hours  PRN    omeprazole (PRILOSEC) 40 mg, oral, Daily before breakfast, Do not crush or chew.    Poly-Iron 150 Forte 150-25-1 mg-mcg-mg capsule TAKE 1 CAPSULE BY MOUTH DAILY FOR ANEMIA    rizatriptan MLT (MAXALT-MLT) 10 mg, oral, As needed, At onset. May repeat every 2 hours as needed. Maximum 3 tablets in 24 hours    tranexamic acid (LYSTEDA) 1,300 mg, oral, 3 times daily    tretinoin (Retin-A) 0.1 % cream 1 Application, Topical, Nightly    Trulicity 4.5 mg/0.5 mL pen injector Inject 4.5 mg subcutaneously every week        Vitals reviewed: There were no vitals taken for this visit.    Physical Exam:     General:  Patient is awake, alert, and oriented.  Patient is in no acute distress.  HEENT:  Pupils equal and reactive.  Normocephalic.  Moist mucosa.    Neck:  No thyromegaly.  Normal Jugular Venous Pressure.  Cardiovascular:  Regular rate and rhythm.  Normal S1 and S2.  1/6 RUMA.  Pulmonary:  Clear to auscultation bilaterally.  Abdomen:  Soft. Non-tender.   Non-distended.  Positive bowel sounds.  Lower Extremities:  2+ pedal pulses. No LE edema.  Neurologic:  Cranial nerves intact.  No focal deficit.   Skin: Skin warm and dry, normal skin turgor.   Psychiatric: Normal affect.    Last Labs:  CBC -      Lab Results   Component Value Date    WBC 8.0 04/15/2023    HGB 10.2 (L) 04/15/2023    HCT 32.6 (L) 04/15/2023     04/15/2023        CMP-  Lab Results   Component Value Date    GLUCOSE 85 05/13/2024     05/13/2024    K 3.9 05/13/2024     05/13/2024    CO2 26 05/13/2024    ANIONGAP 13 05/13/2024    BUN 14 05/13/2024    CREATININE 0.74 05/13/2024    EGFR >90 05/13/2024    CALCIUM 8.7 05/13/2024    PROT 7.0 05/13/2024    ALBUMIN 3.7 05/13/2024    AST 8 (L) 05/13/2024    ALT 7 05/13/2024    ALKPHOS 49 05/13/2024    BILITOT 0.2 05/13/2024        LIPIDS-  Lab Results   Component Value Date    CHOL 193 03/13/2024    TRIG 59 03/13/2024    HDL 52.7 03/13/2024    CHHDL 3.7 03/13/2024    VLDL 12 03/13/2024     "    OTHERS-  Lab Results   Component Value Date    HGBA1C 5.6 05/13/2024    BNP 24 07/10/2022        I personally reviewed the patient's recent vitals, labs, medications, orders, EKGs, pertinent cardiac imaging/ echocardiography and ischemic evaluations including stress testing/ cardiac catheterization.      [facility] 1/20/2025  \" Enlarged Cardiac Silhouette\"         [facility] 1/20/2025      Echocardiogram 09/2023  CONCLUSIONS:  1. Left ventricular systolic function is normal with a 65-70% estimated ejection fraction.  2. There is a small pericardial effusion up to 11mm. The effusion is circumferential.      Echocardiogram 10/2021  1. The left ventricular systolic function is normal with a 60-65% estimated ejection fraction.  2. Anterior pericardial effusion measured 0.4 cm, posterior effusion measured 0.5 - 0.9 cm. Mitral inflow respiratory variation is 8%.  3. There is no evidence of cardiac tamponade.     Assessment and Plan:  Problem List Items Addressed This Visit       Cardiomyopathy    Overview     Enlarged Cardiac Silhouette noted on [facility] CXR and associated with lower voltage on EKG   -- She has a Hx of a small Pericardial Effusion last seen on her Echocardiogram 12/2023           Current Assessment & Plan     We will obtain a transthoracic echocardiogram for structural evaluation including ejection fraction, assessment of regional wall motion abnormalities or valvular disease, and further evaluation of hemodynamics.  -- This will help to reassess for re-accumulation / stability of her previously seen pericardial effusion as well as to assess for any interval dilation that could explain CXR findings.            Relevant Orders    Transthoracic echo (TTE) complete    Pericardial effusion ([facility]) - Primary    Current Assessment & Plan     We will obtain a transthoracic echocardiogram for structural evaluation including ejection fraction, assessment of " regional wall motion abnormalities or valvular disease, and further evaluation of hemodynamics.  -- This will help to reassess for re-accumulation / stability of her previously seen pericardial effusion as well as to assess for any interval dilation that could explain CXR findings.          Relevant Orders    ECG 12 lead (Clinic Performed)    Transthoracic echo (TTE) complete         Please followup with me in Cardiology clinic within 1 to 2 weeks after completion of her echocardiogram    Please return to clinic sooner or seek emergent care if your symptoms reoccur or worsen.    Thank you for allowing me to participate in their care.  Please feel free to call me with any further questions or concerns.        Kieran Marmolejo DO   Division of Cardiovascular Medicine  [facility], [facility]

## 2025-01-28 ENCOUNTER — APPOINTMENT (OUTPATIENT)
Dept: CARDIOLOGY | Facility: CLINIC | Age: 35
End: 2025-01-28
Payer: MEDICAID

## 2025-01-31 ENCOUNTER — ANCILLARY PROCEDURE (OUTPATIENT)
Dept: CARDIOLOGY | Facility: CLINIC | Age: 35
End: 2025-01-31
Payer: MEDICAID

## 2025-01-31 DIAGNOSIS — I31.39 PERICARDIAL EFFUSION (HHS-HCC): ICD-10-CM

## 2025-01-31 DIAGNOSIS — I42.8 OTHER CARDIOMYOPATHY: ICD-10-CM

## 2025-01-31 PROCEDURE — 93306 TTE W/DOPPLER COMPLETE: CPT | Performed by: INTERNAL MEDICINE

## 2025-01-31 PROCEDURE — 93306 TTE W/DOPPLER COMPLETE: CPT

## 2025-02-03 LAB
AORTIC VALVE PEAK VELOCITY: 1.65 M/S
AV PEAK GRADIENT: 11 MMHG
AVA (PEAK VEL): 1.7 CM2
EJECTION FRACTION APICAL 4 CHAMBER: 59.1
EJECTION FRACTION: 60 %
LEFT ATRIUM VOLUME AREA LENGTH INDEX BSA: 22 ML/M2
LEFT VENTRICLE INTERNAL DIMENSION DIASTOLE: 4.28 CM (ref 3.5–6)
LEFT VENTRICULAR OUTFLOW TRACT DIAMETER: 1.77 CM
MITRAL VALVE E/A RATIO: 1.26
RIGHT VENTRICLE FREE WALL PEAK S': 10 CM/S
RIGHT VENTRICLE PEAK SYSTOLIC PRESSURE: 28.2 MMHG
TRICUSPID ANNULAR PLANE SYSTOLIC EXCURSION: 2.3 CM

## 2025-02-18 ENCOUNTER — TELEMEDICINE (OUTPATIENT)
Dept: CARDIOLOGY | Facility: CLINIC | Age: 35
End: 2025-02-18
Payer: MEDICAID

## 2025-02-18 DIAGNOSIS — I42.8 OTHER CARDIOMYOPATHY: Primary | ICD-10-CM

## 2025-02-18 DIAGNOSIS — I31.39 PERICARDIAL EFFUSION (HHS-HCC): ICD-10-CM

## 2025-02-18 PROCEDURE — 99213 OFFICE O/P EST LOW 20 MIN: CPT | Performed by: INTERNAL MEDICINE

## 2025-02-18 NOTE — PROGRESS NOTES
"Referred by Kieran Marmolejo DO for No chief complaint on file.     HPI:    Alberto Gerard is a 34 y.o. female with pertinent history of   has a past medical history of COVID-19 (07/22/2022), High risk heterosexual behavior (09/18/2015), Other specified cough (08/15/2022), Other specified disorders of nose and nasal sinuses (12/04/2020), Personal history of other mental and behavioral disorders (11/06/2019), Personal history of other specified conditions (05/24/2022), and Personal history of pneumonia (recurrent) (08/05/2022). who presents to cardiology clinic to establish care.       08/2023   She had been getting ready to get kids off to school in the AM. she was doing her daughters hair and felt her heart begin to race. States that over the last sveral weeks, she had noted racing heart. it \"lasted for a while\"         Today 9/8/2023-- she presents to establish are after her presentation to [facility]. She had completed a 7 day monitor within the [employer]. States that she did feel symptoms while wearing the [employer] Monitor. States that she continues to feel symptoms of palpitations, lasting 5-10 minutes and then abating.         9/26/2023 -- She \"Returns\" for a virtual Telehealth encounter to discuss the results of her studies. This visit was completed via telephone due to the restrictions of the COVID-19 pandemic. All issues as below were discussed and addressed but no physical exam was performed. If it was felt that the patient should be evaluated in clinic then they were directed there. The patient verbally consented to visit.     I spent 6 minutes with patient and/or family on the phone and more than 50% of the time was spent counseling and/or coordinating care.     She reports that she has done well since her last encounter. She reports that she has noticed that her heart palpitations, racing heart chest discomfort dramatically improved. She has not had to use any of her " "metoprolol for breakthrough heart palpitations. She notes that she stopped Trulicity and for 1 week and symptoms abated. She will discuss with the ordering provider about other alternative medications.    1/23/2025 -- She present =s for follow up. She has nopt been seen in person in about 18 months.  She reports that 1/17/2025 she had been having some chest discomfort. She went to [facility] where work up was benign with a normal high-sensitivity cardiac troponin despite ongoing symptoms.  Her chest x-ray however was interpreted as cardiomegaly with an enlarged cardiac silhouette on a upright 2 view chest x-ray.  Her EKG also was suggestive of lower voltage.  Given her known history of pericardial effusion, she decided to keep her scheduled appointment for follow-up.    02/18/2028 --> Virtual or Telephone Consent  An interactive audio and video telecommunication system which permits real time communications between the patient (at the originating site) and provider (at the distant site) was utilized to provide this telehealth service.   Verbal consent was requested and obtained from Alberto Gerard on this date, 02/18/25 for a telehealth visit.  She \"returns\" for virtual follow-up and discussion of testing results.  Since she was last seen, she has done well.  She has not had any further symptomatology regarding her chest.  She has not had had any return visits to the emergency department.  She overall feels significantly improved.          No exacerbating or relieving factors.  Patient denies chest pain and angina.  Pt denies orthopnea, and paroxysmal nocturnal dyspnea.  Pt denies worsening lower extremity edema.  Pt denies palpitations or syncope.  No recent falls.  No fever or chills.  No cough.  No change in bowel or bladder habits.  No sick contacts.  No recent travel.    12 point review of systems including (Constitutional, Eyes, ENMT, Respiratory, Cardiac, Gastrointestinal, Neurological, " Psychiatric, and Hematologic) was performed and is otherwise negative.    Past medical history reviewed:   has a past medical history of COVID-19 (07/22/2022), High risk heterosexual behavior (09/18/2015), Other specified cough (08/15/2022), Other specified disorders of nose and nasal sinuses (12/04/2020), Personal history of other mental and behavioral disorders (11/06/2019), Personal history of other specified conditions (05/24/2022), and Personal history of pneumonia (recurrent) (08/05/2022).    Past surgical history reviewed:   has a past surgical history that includes Other surgical history (09/29/2015).    Social history reviewed:   reports that she has never smoked. She has never used smokeless tobacco. She reports that she does not currently use alcohol. She reports that she does not use drugs.     Family history reviewed:    Family History   Problem Relation Name Age of Onset    Diabetes Mother      Other (cardiac disorder) Other Maternal Relatives        Allergies reviewed: Morphine and Codeine     Medications reviewed:   Current Outpatient Medications   Medication Instructions    acetaminophen (TYLENOL) 500 mg, Every 6 hours PRN    albuterol 90 mcg/actuation inhaler 2 puffs, inhalation, Every 4 hours PRN    cetirizine (ZYRTEC) 10 mg, oral, Daily    clindamycin (Cleocin T) 1 % external solution Topical, Daily    clobetasol (Temovate) 0.05 % ointment APPLY TO SCALP DAILY MONDAY THROUGH FRIDAY    fluticasone (Flonase) 50 mcg/actuation nasal spray 1 spray, Each Nostril, Daily, shake liquid    glucose 4 gram chewable tablet CHEW AND SWALLOW 4 TABLETS BY MOUTH ONCE AS NEEDED FOR DIZZINESS    guaiFENesin (MUCINEX) 1,200 mg, oral, 2 times daily, Do not crush, chew, or split.    hydrocortisone 2.5 % ointment APPLY TO FACE BID MONDAY THROUGH FRIDAY PRN    ibuprofen 800 mg tablet Take one tablet with food po for headache and menstrual cramps every 8 hours    ipratropium (Atrovent HFA) 17 mcg/actuation inhaler 2  puffs, inhalation, 2 times daily RT    ipratropium-albuteroL (Duo-Neb) 0.5-2.5 mg/3 mL nebulizer solution 3 mL, 3 times daily RT    ketoconazole (NIZOral) 2 % shampoo APPLY TO SCALP ONCE EVERY OTHER WEEK - LEAVE ON FOR 10 TO 15 MINUTES THEN RINSE OUT    meclizine (Antivert) 12.5 mg tablet 1 tablet, Every 6 hours during day    metoprolol tartrate (Lopressor) 25 mg tablet 1-2 tablets, Every 6 hours PRN    minocycline 100 mg, Nightly    omeprazole (PRILOSEC) 40 mg, oral, Daily before breakfast, Do not crush or chew.    Poly-Iron 150 Forte 150-25-1 mg-mcg-mg capsule TAKE 1 CAPSULE BY MOUTH DAILY FOR ANEMIA    rizatriptan MLT (MAXALT-MLT) 10 mg, oral, As needed, At onset. May repeat every 2 hours as needed. Maximum 3 tablets in 24 hours        Vitals reviewed: There were no vitals taken for this visit.    Physical Exam:   An interactive audio and video telecommunication system which permits real time communications between the patient (at the originating site) and provider (at the distant site) was utilized to provide this telehealth service.   General: awake, alert, non-diaphoretic, no psychomotor agitation, no acute distress.  Head: atraumatic, normocephalic, no rashes or lesions noted.  Eyes: no redness, discharge, swelling, or lesions.  Nose: no redness, swelling, discharge, deformity, or impetigo/crusting.  Skin: no lesions, wounds, erythema, or cyanosis noted on face or hands.  Cardiopulmonary: no increased respiratory effort, speaking in clear sentences, inspiratory to expiratory ratio within normal limits.  Musculoskeletal: normal range of motion of neck, upper extremities, and hands with no obvious synovitis.  Neurologic: cranial nerves grossly normal, speech normal rate and rhythm, moved both upper extremities equally.  Psychiatric:  normal appearance, normal behavior, and normal attitude; well groomed, pleasant, cooperative.      Last Labs:  CBC -      Lab Results   Component Value Date    WBC 8.0 04/15/2023  "   HGB 10.2 (L) 04/15/2023    HCT 32.6 (L) 04/15/2023     04/15/2023        CMP-  Lab Results   Component Value Date    GLUCOSE 85 05/13/2024     05/13/2024    K 3.9 05/13/2024     05/13/2024    CO2 26 05/13/2024    ANIONGAP 13 05/13/2024    BUN 14 05/13/2024    CREATININE 0.74 05/13/2024    EGFR >90 05/13/2024    CALCIUM 8.7 05/13/2024    PROT 7.0 05/13/2024    ALBUMIN 3.7 05/13/2024    AST 8 (L) 05/13/2024    ALT 7 05/13/2024    ALKPHOS 49 05/13/2024    BILITOT 0.2 05/13/2024        LIPIDS-  Lab Results   Component Value Date    CHOL 193 03/13/2024    TRIG 59 03/13/2024    HDL 52.7 03/13/2024    CHHDL 3.7 03/13/2024    VLDL 12 03/13/2024        OTHERS-  Lab Results   Component Value Date    HGBA1C 5.6 05/13/2024    BNP 24 07/10/2022        I personally reviewed the patient's recent vitals, labs, medications, orders, EKGs, pertinent cardiac imaging/ echocardiography and ischemic evaluations including stress testing/ cardiac catheterization.      [facility] 1/20/2025  \" Enlarged Cardiac Silhouette\"         [facility] 1/20/2025      Echocardiogram 09/2023  CONCLUSIONS:  1. Left ventricular systolic function is normal with a 65-70% estimated ejection fraction.  2. There is a small pericardial effusion up to 11mm. The effusion is circumferential.      Echocardiogram 10/2021  1. The left ventricular systolic function is normal with a 60-65% estimated ejection fraction.  2. Anterior pericardial effusion measured 0.4 cm, posterior effusion measured 0.5 - 0.9 cm. Mitral inflow respiratory variation is 8%.  3. There is no evidence of cardiac tamponade.     Assessment and Plan:  Problem List Items Addressed This Visit       Cardiomyopathy - Primary    Overview     Enlarged Cardiac Silhouette noted on [facility] CXR and associated with lower voltage on EKG   -- She has a Hx of a small Pericardial Effusion last seen on her Echocardiogram 12/2023           " Pericardial effusion ([facility])    Overview     Repeated echocardiogram ( 1/2025) showed no significant change from pericardial effusion   1. Left ventricular ejection fraction is normal, calculated by Yap's biplane at 60%.   2. There is normal right ventricular global systolic function.   3. Right ventricular systolic pressure is within normal limits.   4. There is a trivial to small circumfrential pericardial effusion. There is no evidence of cardiac tamponade. The pericardial effusion is unchanged as compared to prior study on 9/19/2023.               Please followup with me in Cardiology clinic within 1 year    Please return to clinic sooner or seek emergent care if your symptoms reoccur or worsen.    Thank you for allowing me to participate in their care.  Please feel free to call me with any further questions or concerns.        Kieran Marmolejo DO   Division of Cardiovascular Medicine  [facility], [facility]

## 2025-03-24 ENCOUNTER — PATIENT MESSAGE (OUTPATIENT)
Dept: PRIMARY CARE | Facility: CLINIC | Age: 35
End: 2025-03-24
Payer: MEDICAID

## 2025-03-24 ENCOUNTER — TELEPHONE (OUTPATIENT)
Dept: PRIMARY CARE | Facility: CLINIC | Age: 35
End: 2025-03-24
Payer: MEDICAID

## 2025-03-24 DIAGNOSIS — Z23 IMMUNIZATION DUE: Primary | ICD-10-CM

## 2025-04-05 ENCOUNTER — TELEMEDICINE (OUTPATIENT)
Dept: PRIMARY CARE | Facility: CLINIC | Age: 35
End: 2025-04-05
Payer: MEDICAID

## 2025-04-05 DIAGNOSIS — R39.9 UTI SYMPTOMS: Primary | ICD-10-CM

## 2025-04-05 PROCEDURE — 99213 OFFICE O/P EST LOW 20 MIN: CPT | Performed by: NURSE PRACTITIONER

## 2025-04-05 RX ORDER — PHENAZOPYRIDINE HYDROCHLORIDE 200 MG/1
200 TABLET, FILM COATED ORAL 3 TIMES DAILY PRN
Qty: 9 TABLET | Refills: 0 | Status: SHIPPED | OUTPATIENT
Start: 2025-04-05 | End: 2025-04-08

## 2025-04-05 RX ORDER — NITROFURANTOIN 25; 75 MG/1; MG/1
100 CAPSULE ORAL 2 TIMES DAILY
Qty: 6 CAPSULE | Refills: 0 | Status: SHIPPED | OUTPATIENT
Start: 2025-04-05 | End: 2025-04-08

## 2025-04-05 ASSESSMENT — ENCOUNTER SYMPTOMS
FLANK PAIN: 0
VOMITING: 0
FREQUENCY: 1
ACTIVITY CHANGE: 0
DIFFICULTY URINATING: 0
FEVER: 0
HEMATURIA: 0
DIZZINESS: 0
NAUSEA: 0
HEADACHES: 0
ABDOMINAL PAIN: 0
LIGHT-HEADEDNESS: 0
FATIGUE: 0
APPETITE CHANGE: 0
MYALGIAS: 0
SHORTNESS OF BREATH: 0
ARTHRALGIAS: 0

## 2025-04-05 NOTE — PATIENT INSTRUCTIONS
Pleasure meeting with you today!    Let me know if you need anything.     Please send me a MyChart message if you have any questions or concerns.  FOR NON URGENT questions only.  Allow up to 72 hours for response.     If you have prescription issues or other questions you can email   Amy Howard,  Digital Health Coordinator, at   [email or web address]

## 2025-04-05 NOTE — PROGRESS NOTES
"Subjective   Patient ID: Alberto Gerard is a 34 y.o. female who presents for UTI (\"About a week ago\").    Odor to urine, increased urination, pelvic pressure, urine is cloudy  Sexually active   Uses protection  Recent sti test negative  Denies vaginal discharge itching  Denies fever, N/V, back pain     UTI   Associated symptoms include frequency and urgency. Pertinent negatives include no flank pain, hematuria, nausea or vomiting.        Review of Systems   Constitutional:  Negative for activity change, appetite change, fatigue and fever.   HENT: Negative.     Respiratory:  Negative for shortness of breath.    Cardiovascular:  Negative for chest pain.   Gastrointestinal:  Negative for abdominal pain, nausea and vomiting.   Genitourinary:  Positive for frequency, pelvic pain and urgency. Negative for decreased urine volume, difficulty urinating, flank pain, hematuria, vaginal discharge and vaginal pain.   Musculoskeletal:  Negative for arthralgias and myalgias.   Neurological:  Negative for dizziness, light-headedness and headaches.       Objective   There were no vitals taken for this visit.    Physical Exam  Constitutional:       General: She is not in acute distress.     Appearance: Normal appearance. She is not ill-appearing.      Comments: On Demand Virtual Visit Patient Consent     An interactive audio and video telecommunication system which permits real time communications between the patient (at the originating site) and provider (at the distant site) was utilized to provide this telehealth service.   Verbal consent was requested and obtained from Alberto Gerard (or parent if under 18) on this date, for a telehealth visit.   I have verbally confirmed with Alberto Gerard (or parent if under 18) that they are physically located in the [facility] during this virtual visit.    I performed this visit using realtime telehealth tools, including an audio/video OR telephone connection between the " patient listed who was located [place] the [place] and myself, Thanh Hernández CNP (licensed in the [facility]).  At the start of the visit, I introduced myself as Thanh Hernández, Nurse practitioner and verified the patients name, , and current physical location.    If they were currently outside of the state of [place], the visit was ended and the patient was referred to alternative means for evaluation and treatment.   The patient was made aware of the limitations of the telehealth visit.  They will not be physically examined and all issues may not be appropriate for a telehealth visit.  If necessary, an in person referral will be made.       DISCLAIMER:   In preparing for this visit and writing this note, I reviewed previous electronic medical records (labs, imaging and medical charts) available.  Significant findings which helped in decision making are recorded in this encounter charting.     Pulmonary:      Effort: Pulmonary effort is normal.   Neurological:      Mental Status: She is alert and oriented to person, place, and time.         Assessment/Plan   Diagnoses and all orders for this visit:  UTI symptoms  -     nitrofurantoin, macrocrystal-monohydrate, (Macrobid) 100 mg capsule; Take 1 capsule (100 mg) by mouth 2 times a day for 3 days.  -     phenazopyridine (Pyridium) 200 mg tablet; Take 1 tablet (200 mg) by mouth 3 times a day as needed for bladder spasms for up to 3 days.  Follow up with PCP if symptoms persist or worsen   Take all the medicine you were prescribed, even if you feel better. Not finishing the medicine can make the infection come back. It may also make a future infection harder to treat.  Unless told not to by your healthcare provider, drink 8 to 12 glasses of fluid every day. Clear fluids, such as water, are best. This may help flush the infection from your system.  Please consider D-mannose supplementation if you continue to get recurrent UTIs as it may help prevent against gram negative  bacteria such as E. coli  Preventing future infection  Keep your genital area clean. Use mild soap. Rinse with water.  If you are a woman, always wipe the genital area from front to back.  Urinate frequently. Don't hold urine in your bladder for a long time.  Always urinate after having sex.  Practice safe sex. Protect yourself and your partner from sexually transmitted infections (STIs).    your symptoms are consistent with a UTI. I will send in a 3 day supply of Macrobid antibiotic.   If symptoms persist or worsen please follow up with PCP   Complete entire coarse of antibiotics  Follow up with PCP as needed  Education provided in writing in [facility]

## 2025-04-10 ENCOUNTER — TELEPHONE (OUTPATIENT)
Dept: PRIMARY CARE | Facility: CLINIC | Age: 35
End: 2025-04-10
Payer: MEDICAID

## 2025-04-10 DIAGNOSIS — Z11.59 SCREENING FOR VIRAL DISEASE: ICD-10-CM

## 2025-04-10 DIAGNOSIS — Z23 IMMUNIZATION DUE: Primary | ICD-10-CM

## 2025-04-11 ENCOUNTER — APPOINTMENT (OUTPATIENT)
Dept: PRIMARY CARE | Facility: CLINIC | Age: 35
End: 2025-04-11
Payer: MEDICAID

## 2025-04-13 PROBLEM — Z23 IMMUNIZATION DUE: Status: ACTIVE | Noted: 2025-04-13

## 2025-04-14 ENCOUNTER — TELEPHONE (OUTPATIENT)
Dept: PRIMARY CARE | Facility: CLINIC | Age: 35
End: 2025-04-14
Payer: MEDICAID

## 2025-04-14 NOTE — TELEPHONE ENCOUNTER
Last Tdap was in March 2018.  No need for titers.  MMR and chickenpox titers ordered.  Please, notify the patient.

## 2025-04-18 LAB
MEV IGG SER IA-ACNC: >300 AU/ML
MUV IGG SER IA-ACNC: 116 AU/ML
RUBV IGG SERPL IA-ACNC: 6.12 INDEX
VZV IGG SER IA-ACNC: 1.7 S/CO

## 2025-04-30 ENCOUNTER — APPOINTMENT (OUTPATIENT)
Dept: PRIMARY CARE | Facility: CLINIC | Age: 35
End: 2025-04-30
Payer: MEDICAID

## 2025-04-30 VITALS
HEART RATE: 86 BPM | BODY MASS INDEX: 36.32 KG/M2 | SYSTOLIC BLOOD PRESSURE: 129 MMHG | TEMPERATURE: 98.2 F | OXYGEN SATURATION: 97 % | HEIGHT: 63 IN | DIASTOLIC BLOOD PRESSURE: 88 MMHG | WEIGHT: 205 LBS

## 2025-04-30 DIAGNOSIS — F90.9 ATTENTION DEFICIT HYPERACTIVITY DISORDER (ADHD), UNSPECIFIED ADHD TYPE: ICD-10-CM

## 2025-04-30 DIAGNOSIS — J45.40 MODERATE PERSISTENT ASTHMA, UNSPECIFIED WHETHER COMPLICATED (HHS-HCC): Primary | ICD-10-CM

## 2025-04-30 DIAGNOSIS — M25.572 ACUTE LEFT ANKLE PAIN: ICD-10-CM

## 2025-04-30 DIAGNOSIS — D64.9 ANEMIA, UNSPECIFIED TYPE: ICD-10-CM

## 2025-04-30 DIAGNOSIS — M25.562 ACUTE PAIN OF LEFT KNEE: ICD-10-CM

## 2025-04-30 PROCEDURE — 3008F BODY MASS INDEX DOCD: CPT | Performed by: NURSE PRACTITIONER

## 2025-04-30 PROCEDURE — 99214 OFFICE O/P EST MOD 30 MIN: CPT | Performed by: NURSE PRACTITIONER

## 2025-04-30 RX ORDER — METHOCARBAMOL 750 MG/1
TABLET ORAL
COMMUNITY
Start: 2025-02-18

## 2025-04-30 RX ORDER — AMMONIUM LACTATE 12 G/100G
LOTION TOPICAL
COMMUNITY
Start: 2024-12-05

## 2025-04-30 RX ORDER — IRON PS COMPLEX/B12/FOLIC ACID 150-25-1
CAPSULE ORAL
Qty: 100 CAPSULE | Refills: 1 | Status: SHIPPED | OUTPATIENT
Start: 2025-04-30

## 2025-04-30 RX ORDER — IPRATROPIUM BROMIDE AND ALBUTEROL SULFATE 2.5; .5 MG/3ML; MG/3ML
3 SOLUTION RESPIRATORY (INHALATION)
Qty: 180 ML | Refills: 1 | Status: SHIPPED | OUTPATIENT
Start: 2025-04-30

## 2025-04-30 ASSESSMENT — ENCOUNTER SYMPTOMS
RESPIRATORY NEGATIVE: 1
CARDIOVASCULAR NEGATIVE: 1
CONSTITUTIONAL NEGATIVE: 1
DEPRESSION: 0

## 2025-04-30 ASSESSMENT — PAIN SCALES - GENERAL: PAINLEVEL_OUTOF10: 6

## 2025-04-30 NOTE — PATIENT INSTRUCTIONS
Your medications were refilled.  Please use Ace bandage as needed to your left knee and ankle.  Rest ice elevate ibuprofen as needed.  Physical therapy referral submitted.  Follow-up for physical exam for the next few months.

## 2025-04-30 NOTE — PROGRESS NOTES
"Subjective   Patient ID: Alberto Gerard is a 34 y.o. female who presents for Establish Care (Patient fell and sprained left ankle. Also having knee pain. Would like therapy for it.).    HPI     Patient presents to clinic to establish care.  Patient with past medical history of anemia, migraine headaches, prediabetes, asthma.      Patient complains of a fall 5 days ago.  Patient states she injured her left ankle and left knee.  Patient was seen and evaluated at the [facility] urgent care on 4/21/2025 she had negative x-rays of left knee ankle and foot.    Today patient states she continues to have some pain with full weightbearing on the left ankle with goran  swelling.      Patient also requesting prescription for Adderall states she was diagnosed with ADHD in the past has not been on medication for years.  She sees a psychologist who felt that she would benefit from Adderall.  Patient will be referred to psychiatry for further evaluation and treatment.      Review of Systems   Constitutional: Negative.    Respiratory: Negative.     Cardiovascular: Negative.    Genitourinary: Negative.    Musculoskeletal:         See HPI   Psychiatric/Behavioral:          See HPI       Objective   /88 (BP Location: Left arm, Patient Position: Sitting, BP Cuff Size: Adult)   Pulse 86   Temp 36.8 °C (98.2 °F) (Temporal)   Ht 1.6 m (5' 3\")   Wt 93 kg (205 lb)   SpO2 97%   BMI 36.31 kg/m²     Physical Exam  Vitals reviewed.   Constitutional:       General: She is not in acute distress.     Appearance: Normal appearance. She is not ill-appearing.   Cardiovascular:      Rate and Rhythm: Normal rate and regular rhythm.   Pulmonary:      Effort: Pulmonary effort is normal.   Musculoskeletal:         General: Normal range of motion.      Comments: Mild left ankle swelling over left lateral malleolus with slightly decreased range of motion, neurovascular intact.   Skin:     General: Skin is warm and dry.   Neurological: "      Mental Status: She is alert and oriented to person, place, and time.   Psychiatric:         Mood and Affect: Mood normal.         Thought Content: Thought content normal.         Judgment: Judgment normal.         Assessment/Plan   Diagnoses and all orders for this visit:  Moderate persistent asthma, unspecified whether complicated ([facility])  -     ipratropium-albuteroL (Duo-Neb) 0.5-2.5 mg/3 mL nebulizer solution; Take 3 mL by nebulization 3 times a day.  Anemia, unspecified type  -     Poly-Iron 150 Forte 150-25-1 mg-mcg-mg capsule; TAKE 1 CAPSULE BY MOUTH DAILY FOR ANEMIA  Acute pain of left knee  -     Referral to Physical Therapy; Future  Acute left ankle pain  -     Referral to Physical Therapy; Future  Attention deficit hyperactivity disorder (ADHD), unspecified ADHD type  -     Referral to Psychiatry; Future

## 2025-05-01 ENCOUNTER — TELEPHONE (OUTPATIENT)
Dept: PRIMARY CARE | Facility: CLINIC | Age: 35
End: 2025-05-01
Payer: MEDICAID

## 2025-05-01 NOTE — TELEPHONE ENCOUNTER
Pt was in yesterday & the knee injury that she was seen for has something sticking out of it now  It is hurting.    Please advise  Ty

## 2025-05-05 ENCOUNTER — TELEPHONE (OUTPATIENT)
Dept: PRIMARY CARE | Facility: CLINIC | Age: 35
End: 2025-05-05
Payer: MEDICAID

## 2025-05-19 ENCOUNTER — TELEPHONE (OUTPATIENT)
Dept: PRIMARY CARE | Facility: CLINIC | Age: 35
End: 2025-05-19
Payer: MEDICAID

## 2025-05-19 NOTE — TELEPHONE ENCOUNTER
Pt is wanting an xray or MRI for her left knee. Pt had a NP from her school look at it and said she suggests an xray or MRI.    Pts #[phone]    TY

## 2025-06-09 ENCOUNTER — EVALUATION (OUTPATIENT)
Dept: PHYSICAL THERAPY | Facility: CLINIC | Age: 35
End: 2025-06-09
Payer: MEDICAID

## 2025-06-09 ENCOUNTER — APPOINTMENT (OUTPATIENT)
Dept: BEHAVIORAL HEALTH | Facility: CLINIC | Age: 35
End: 2025-06-09
Payer: MEDICAID

## 2025-06-09 DIAGNOSIS — M25.562 ACUTE PAIN OF LEFT KNEE: ICD-10-CM

## 2025-06-09 DIAGNOSIS — M25.572 ACUTE LEFT ANKLE PAIN: Primary | ICD-10-CM

## 2025-06-09 PROCEDURE — 97110 THERAPEUTIC EXERCISES: CPT | Mod: GP

## 2025-06-09 PROCEDURE — 97161 PT EVAL LOW COMPLEX 20 MIN: CPT | Mod: GP

## 2025-06-09 NOTE — PROGRESS NOTES
Physical Therapy    Physical Therapy Evaluation and Treatment      Patient Name: Alberto Gerard  MRN: [id number]  Today's Date: 6/9/2025    Time Entry:   Time Calculation  Start Time: 1300  Stop Time: 1350  Time Calculation (min): 50 min  PT Evaluation Time Entry  PT Evaluation (Low) Time Entry: 15  PT Therapeutic Procedures Time Entry  Therapeutic Exercise Time Entry: 20  Insurance: [facility] Community Plan   Visit Limit: Auth Needed   Visit: 1   Outcome measure LEFS: 37/80    Assessment:  PT Assessment  Assessment Comment: Patient presents with signs and symptoms consistent with the medical diagnosis of left ankle inversion sprain and achilles tendinitis. She will benefit from medically necessary skilled physical therapy interventions improve function of the left ankle to facilitate daily activities.     Plan:  OP PT Plan  Treatment/Interventions: Cryotherapy, Dry needling, Education/ Instruction, Electrical stimulation, Gait training, Hot pack, Manual therapy, Neuromuscular re-education, Self care/ home management, Therapeutic activities, Therapeutic exercises, Vasopneumatic device  PT Plan: Skilled PT  PT Frequency: 1 time per week  Duration: 10 weeks  Onset Date: 04/21/25  Certification Period Start Date: 06/09/25  Certification Period End Date: 09/07/25  Number of Treatments Authorized: Auth Needed  Rehab Potential: Good  Plan of Care Agreement: Patient    Current Problem:   Problem List Items Addressed This Visit    None  Visit Diagnoses         Codes      Acute left ankle pain    -  Primary M25.572    Relevant Orders    Follow Up In Physical Therapy      Acute pain of left knee     M25.562              Subjective    General:  General  Reason for Referral: left leg pain  Referred By: Pauline Still, THALIA-CNP  Preferred Learning Style: auditory, kinesthetic, verbal, visual, written  General Comment: Patient is a 36 y/o female who was referred to outpatient physical therapy due to left knee and left  "ankle pain. She twisted her left ankle and fell in April 2025. Patient was seen and evaluated at the [facility] urgent care on 4/21/2025 she had negative x-rays of left knee, ankle and foot. She notes that her ankle pain is lateral and posterior. When asked to localize her pain she points to the distal insertion of the Achilles tendon and inferior and lateral of the lateral malleous. She reports that it swells on and off. It will get stiff with prolonged periods of inactivity. She reports the most pain when she gets out of bed. Patient notes an increase in pain with prolonged weight bearing activity. She is taking ibuprofen. She is not icing. She reports a nodule on the anterior knee and has an MRI scheduled for her left knee. Patient states that her whole knee hurts.  Precautions:   Moderate fall risk  Pain:   6/10  Prior Level of Function:   Independent with all ADLs    Objective   Cognition:   WNL  General Assessments:  Lower extremity ROM  Left ankle   Plantarflexion 67'  Dorsiflexion 8'  Inversion 28'  Eversion 8'    Right ankle   Plantarflexion 67'  Dorsiflexion 13'  Inversion 28'  Eversion 12'    Right knee ROM: 0-0-129  Left knee ROM: 0-0-127    LE strength (R/L)   Knee flexion 5/5, 4/5   Knee extension 5/5, 3+/5   Ankle plantarflexion 5/5, 3+/5   Ankle dorsiflexion 5/5, 3+/5   Ankle inversion 5/5, 3/5  Ankle eversion 5/5, 3/5    Patient is tender to palpation along the distal insertion of the achilles tendon and inferior to the lateral malleous on the left ankle. Her left knee is tender to palpation along the anterior nodule, lateral joint line and distal hamstring insertions.     Patient declined to participate in special testing.   Functional Assessments:  Antalgic gait pattern  Single leg balance left 0 seconds     Treatments:  Therapeutic Exercise:  Seated gastroc stretch 5 x 10\"  Seated soleus stretch 5 x 10\"  Ankle circles x 10 CW and CCW  Towel scruches x 20  Toe yoga x 10  Seated hamstring " "stretch 5 x 10\"    EDUCATION:   Home exercise program daily. Recommended and ASO ankle brace.     Goals:  Active       PT Problem       Patient will report compliance with her home exercise program.         Start:  25    Expected End:  25            Patient will report improvement via the LEFS to demonstrate improved activity tolerance.        Start:  25    Expected End:  25            Patient will report pain free ankle ROM.        Start:  25    Expected End:  25            Patient will demonstrate improvements in left ankle strength to 5/5 to facilitate weight bearing activity.         Start:  25    Expected End:  25               PT Problem       Patient will ambulate with a normalized gait pattern.        Start:  25    Expected End:  25                Insurance Authorization Information  Date of Evaluation: 25    Onset Date: No onset impairment date on file.    Referring Physician: Pauline Still     Surgery in the Last 3 months:  no    CPT Codes: Therapeutic Exercise: 66175 Therapeutic Activity: 35675 Neuromuscular Re-Education: 75710 Manual Therapy: 48866 Gait Trainin Self-Care/Home Management Trainin Mechanical Traction: 89934 Electric Stimulation (Attended): 05970 Electric Stimulation (Unattended): 62326 Vasopneumatic Device: 45526 PT Re-Evaluation: 55882     Diagnosis:   Problem List Items Addressed This Visit    None  Visit Diagnoses         Codes      Acute left ankle pain    -  Primary M25.572    Relevant Orders    Follow Up In Physical Therapy      Acute pain of left knee     M25.562               Functional Outcome:  Other Measures  Lower Extremity Funtional Score (LEFS): 37    OT / PT Evaluation complexity:  low    Which of the following best describes the primary reason of therapy: Improving, restoring, or adapting functional mobility or skills    Visits Requested: 10    Date Range: 90 days    Select all " conditions that apply: None of these apply     Jovany Wilkins, PT

## 2025-06-11 DIAGNOSIS — M25.562 ACUTE PAIN OF LEFT KNEE: Primary | ICD-10-CM

## 2025-06-11 RX ORDER — LIDOCAINE 50 MG/G
1 PATCH TOPICAL DAILY
Qty: 30 PATCH | Refills: 0 | Status: SHIPPED | OUTPATIENT
Start: 2025-06-11 | End: 2026-06-11

## 2025-06-23 ENCOUNTER — APPOINTMENT (OUTPATIENT)
Dept: BEHAVIORAL HEALTH | Facility: CLINIC | Age: 35
End: 2025-06-23
Payer: MEDICAID

## 2025-06-26 ENCOUNTER — TELEMEDICINE (OUTPATIENT)
Dept: BEHAVIORAL HEALTH | Facility: CLINIC | Age: 35
End: 2025-06-26
Payer: MEDICAID

## 2025-06-26 DIAGNOSIS — R41.840 INATTENTION: Primary | ICD-10-CM

## 2025-06-26 PROCEDURE — 1036F TOBACCO NON-USER: CPT

## 2025-06-26 PROCEDURE — 90791 PSYCH DIAGNOSTIC EVALUATION: CPT

## 2025-06-26 ASSESSMENT — ENCOUNTER SYMPTOMS
CONSTITUTIONAL NEGATIVE: 1
PSYCHIATRIC NEGATIVE: 1

## 2025-06-26 ASSESSMENT — ANXIETY QUESTIONNAIRES
6. BECOMING EASILY ANNOYED OR IRRITABLE: NOT AT ALL
2. NOT BEING ABLE TO STOP OR CONTROL WORRYING: NOT AT ALL
IF YOU CHECKED OFF ANY PROBLEMS ON THIS QUESTIONNAIRE, HOW DIFFICULT HAVE THESE PROBLEMS MADE IT FOR YOU TO DO YOUR WORK, TAKE CARE OF THINGS AT HOME, OR GET ALONG WITH OTHER PEOPLE: SOMEWHAT DIFFICULT
GAD7 TOTAL SCORE: 3
7. FEELING AFRAID AS IF SOMETHING AWFUL MIGHT HAPPEN: SEVERAL DAYS
1. FEELING NERVOUS, ANXIOUS, OR ON EDGE: NOT AT ALL
4. TROUBLE RELAXING: SEVERAL DAYS
3. WORRYING TOO MUCH ABOUT DIFFERENT THINGS: NOT AT ALL
5. BEING SO RESTLESS THAT IT IS HARD TO SIT STILL: SEVERAL DAYS

## 2025-06-26 NOTE — PROGRESS NOTES
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 Patient will see a PhD psychologist for the evaluation.    I performed this visit using real-time telehealth tools, including an AUDIO/VIDEO connection between Alberto Gerard who is at Home and JOSE MANUEL Salas who is at At home office working via Telehealth.    Virtual or Telephone Consent    An interactive audio and video telecommunication system which permits real time communications between the patient (at the originating site) and provider (at the distant site) was utilized to provide this telehealth service.   Verbal consent was requested and obtained from Alberto Gerard on this date, 06/26/25 for a telehealth visit and the patient's location was confirmed at the time of the visit.    Patient is reminded that if there is SI to call [phone] and get themselves to the closest ED for evaluation, otherwise contact me for other questions/concerns.    Alberto Gerard is a 35 y.o. 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 I can't focus. \"     Problem List Items Addressed This Visit           ICD-10-CM    Inattention - Primary R41.840       Patient presenting to outpatient treatment for a scheduled psych outpatient psychiatric evaluation.    HPI  Background:    Patient is currently at home for this visit.  Patient explains that symptoms of struggle with focus and attention began in April.   Patient explains that during the time symptoms exacerbated it was last month when mid-term for nursing school started.   The duration of symptoms occurred for \" a while. \"     Characteristics/Recent psychiatric symptoms (pertinent positives and negatives):      Reports struggling with focus and attention daily at school. Reports zoning out frequently both at school and at home. Denies depressive symptoms. Denies anxiety " "and panic attacks. Reports improvement in symptoms.  Reports getting 7- 8 hours of sleep at night. Appetite is normal.  Patient ranks the severity of anxiety using the CYNDY 7 scale with a rating of 3 for  anxiety symptoms. Patient ranks the severity of depression using the PHQ 9 scale with a rating of 0 for  depressive symptoms.  NOTE: Symptom scale is rated where 0 = no symptoms at all, and 10 = symptoms so severe that pt is an imminent danger to themselves or others and requires hospitalization.    Focus issues remain(s) present more days than not, which has worsened  over the past few weeks. Alberto Gerard rates the severity of psych symptoms as a 7/10 for focus and inattention, noting symptom improvement with taking electrolyte, going in quiet places away from distraction and worsening of symptoms with \" anything that bothers me.\"        Psychiatric Review Of Systems:    -Depressive Symptoms: negative  -Manic Symptoms: negative  -Anxiety Symptoms: Negative  -Psychotic Symptoms: negative  -Other Symptoms:  -Sleep/Energy/Motivation: Denies  -Appetite/Weight Changes: Appetite is normal, no changes in weight  -Psychosis: Denies  -SI/HI: Denies        REVIEW OF SYSTEMS  Review of Systems   Constitutional: Negative.    Psychiatric/Behavioral: Negative.       All other ROS negative    [x]  Firearms at home  HISTORY  PSYCH HISTORY  -Psych Hx: Denies  -Psych Hospitalization Hx: Denies  -Suicide Attempt Hx: Denies  -Self-Harm/Violence Hx:Denies  -Current psych meds: none  -Psych Med Hx:  \"Adderall maybe in 2016 \"    SUBSTANCE USE HISTORY  -Substance Use Hx: Denies  -Tobacco: Denies  -Use of alcohol: occasional, social use  -Use of caffeine: Dm Tea once a month  -Substance Abuse Treatment Hx: Denies    FAMILY HISTORY  -Family Psych Hx: Denies  -Family Suicide Hx: Denies  -Family Substance Abuse Hx: Denies  SOCIAL HISTORY  -Supports: Family and friends  -Housing: Lives with 3 kids, boyfriend and a dog  -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  -Education: In nursing school  -Legal: Denies  -Abuse Hx: Denies    Current Medications:  Current Medications[1]     Medical History:  Medical History[2]  Surgical History:  Surgical History[3]  Family History:  Family History[4]  Social History:  Social History     Socioeconomic History    Marital status: Single     Spouse name: Not on file    Number of children: Not on file    Years of education: Not on file    Highest education level: Not on file   Occupational History    Not on file   Tobacco Use    Smoking status: Never    Smokeless tobacco: Never   Substance and Sexual Activity    Alcohol use: Yes     Comment: on occasion    Drug use: Never    Sexual activity: Yes     Partners: Male     Birth control/protection: I.U.D.   Other Topics Concern    Not on file   Social History Narrative    Not on file     Social Drivers of Health     Financial Resource Strain: Not on file   Food Insecurity: Not on file   Transportation Needs: Not on file   Physical Activity: Not on file   Stress: Not on file   Social Connections: Not on file   Intimate Partner Violence: Not on file   Housing Stability: Not on file       Vitals:  There were no vitals filed for this visit.    Allergies:  RX Allergies[5]  -PCP: JOSE MANUEL Ku  -Pt reports currently is not pregnant  -TBI/head trauma/LOC/seizure hx: Denies      Objective   Mental Status Exam    Appearance: Well groomed , appropriate eye contact    Attitude: Cooperative, and engaged in conversation.    Behavior: Appropriate eye contact.     Motor Activity: No psychomotor agitation or retardation. No abnormal movements, tremors or tics. No evidence of extrapyramidal symptoms or tardive dyskinesia.    Speech: Regular rate, rhythm, volume. Spontaneous, no pressured speech.    Mood:  \" I am okay '    Affect: Full range, mood congruent.    Thought Process: Linear, logical, and goal-directed. No loose associations or gross thought " disorganization.    Thought Content:   Denied current suicidal ideation or thoughts of harm to self, denied homicidal ideation or thoughts of harm to others. No delusional thinking elicited. No perseverations or obsessions identified.     Perception: Did not endorse auditory or visual hallucinations, did not appear to be responding to hallucinatory stimuli.     Cognition: Alert, oriented x3. Preserved attention span and concentration, recent and remote memory. Adequate fund of knowledge. No deficits in language.     Insight: Fair, in regards to understanding mental health condition    Judgement: Fair        Physical Exam    IMPRESSION  -Get Adult Neurological Assessment for ADHD    -Diagnoses and all orders for this visit:  Inattention (Primary)         MEDICAL-DECISION MAKING    -START psychotherapy. Follow-up with  in 4-8 weeks for medication management.     Psych med regimen as follows:   -GET Adult Neurological Assessment for ADHD   -START Psychotherapy   -CONTINUE exercising and eating heathy diet  -Safety plan reviewed   -READ attached information about the prescribed new medication   - CALL OFFICE IN CASE OF SIDE EFFECT TO SCHEDULE A VISIT FOR ASSESSMENT -[phone]  /HI ASSESSMENT  -Risk Assessment: The pt is currently a low acute risk of suicide and self-harm due to no past suicide attempt(s) and not currently endorsing thoughts of suicide. The pt is currently a low acute risk of violence and harm to others due to no past history of violence and not currently threatening others.  -Suicidal Risk Factors: current psychiatric illness  -Violence Risk Factors: current psychiatric illness  -Protective Factors: strong coping skills and fear of suicide  -Plan to Reduce Risk: Establish medication regimen and outpatient follow-up care   Denied current suicidal ideation or thoughts of harm to self, denied homicidal ideation or thoughts of harm to others. No delusional thinking elicited. No perseverations or  obsessions identified.       PLAN  -Continue Medications as directed.  -Follow-up with  in 4-8 weeks.  -Risks/benefits/assessment of medication interventions discussed with pt; pt agreeable to plan. Will continue to monitor for symptoms mgmt and SEs and adjust plan as needed.  -MI to increase coping skills/behavior regulation.  -Safety plan reviewed.  -Call  Psychiatry at [phone] with issues.  -For [facility], [employer] is a 24/7 hotline you can call for assistance at [phone]. Please call 911 or go to your closest Emergency Room if you feel worse. This includes thoughts of hurting yourself or anyone else, or having other troubles such as hearing voices, seeing visions, or having new and scary thoughts about the people around you.    OARRS:  JOSE MANUEL Salas on 6/26/2025 10:42 AM  I have personally reviewed the OARRS report for Alberto Gerard. I have considered the risks of abuse, dependence, addiction and diversion  Medication is felt to be clinically appropriate based on documented diagnosis.          Cely T Robles, APRN-CNP  Record Review: extensive   CALL OFFICE IN CASE OF SIDE EFFECT TO SCHEDULE A VISIT FOR ASSESSMENT -[phone]  Follow up:   with  in 4-8 weeks  Time Spent:  Prep:   Direct time: 50 minutes  Documentation: 4 minutes  Total: 54 minutes       [1]   Current Outpatient Medications:     Acne Medication 5 % gel, , Disp: , Rfl:     albuterol 90 mcg/actuation inhaler, Inhale 2 puffs every 4 hours if needed (asthma)., Disp: 18 g, Rfl: 1    ammonium lactate (Lac-Hydrin) 12 % lotion, APPLY TO BODY TWICE DAILY. AVOID THE FACE, Disp: , Rfl:     cetirizine (ZyrTEC) 10 mg tablet, Take 1 tablet (10 mg) by mouth once daily., Disp: 30 tablet, Rfl: 5    clobetasol (Temovate) 0.05 % ointment, APPLY TO SCALP DAILY MONDAY THROUGH FRIDAY, Disp: , Rfl:     fluticasone (Flonase) 50 mcg/actuation nasal spray, Administer 1 spray into each nostril once daily. shake  liquid, Disp: 16 g, Rfl: 2    hydrocortisone 2.5 % ointment, APPLY TO FACE BID MONDAY THROUGH FRIDAY PRN, Disp: , Rfl:     ibuprofen 800 mg tablet, Take one tablet with food po for headache and menstrual cramps every 8 hours, Disp: 90 tablet, Rfl: 1    ipratropium (Atrovent HFA) 17 mcg/actuation inhaler, Inhale 2 puffs 2 times a day., Disp: 12.9 g, Rfl: 1    ipratropium-albuteroL (Duo-Neb) 0.5-2.5 mg/3 mL nebulizer solution, Take 3 mL by nebulization 3 times a day., Disp: 180 mL, Rfl: 1    ketoconazole (NIZOral) 2 % shampoo, APPLY TO SCALP ONCE EVERY OTHER WEEK - LEAVE ON FOR 10 TO 15 MINUTES THEN RINSE OUT, Disp: , Rfl:     lidocaine (Lidoderm) 5 % patch, Place 1 patch over 12 hours on the skin once daily. Apply to painful area 12 hours per day, remove for 12 hours., Disp: 30 patch, Rfl: 0    omeprazole (PriLOSEC) 40 mg DR capsule, Take 1 capsule (40 mg) by mouth once daily in the morning. Take before meals. Do not crush or chew., Disp: 30 capsule, Rfl: 2    Poly-Iron 150 Forte 150-25-1 mg-mcg-mg capsule, TAKE 1 CAPSULE BY MOUTH DAILY FOR ANEMIA, Disp: 100 capsule, Rfl: 1    meclizine (Antivert) 12.5 mg tablet, Take 1 tablet (12.5 mg) by mouth every 6 hours during the day. (Patient not taking: Reported on 6/26/2025), Disp: , Rfl:     minocycline 100 mg capsule, Take 1 capsule (100 mg) by mouth once daily at bedtime. (Patient not taking: Reported on 6/26/2025), Disp: , Rfl:   [2]   Past Medical History:  Diagnosis Date    Anxiety     COVID-19 07/22/2022    COVID-19 virus infection    Headache     High risk heterosexual behavior 09/18/2015    Unprotected sexual intercourse    Other specified cough 08/15/2022    Post-viral cough syndrome    Other specified disorders of nose and nasal sinuses 12/04/2020    Sinus pressure    Personal history of other mental and behavioral disorders 11/06/2019    History of attention deficit hyperactivity disorder (ADHD)    Personal history of other specified conditions 05/24/2022     "History of dizziness    Personal history of pneumonia (recurrent) 08/05/2022    History of community acquired pneumonia   [3]   Past Surgical History:  Procedure Laterality Date    OTHER SURGICAL HISTORY  09/29/2015    Prior Surgical Procedure Not Done   [4]   Family History  Problem Relation Name Age of Onset    Diabetes Mother      Other (cardiac disorder) Other Maternal Relatives    [5]   Allergies  Allergen Reactions    Morphine Other     burning in neck area    Burning in ears    \"Burns\"    Codeine Unknown     "

## 2025-07-07 ENCOUNTER — TREATMENT (OUTPATIENT)
Dept: PHYSICAL THERAPY | Facility: CLINIC | Age: 35
End: 2025-07-07
Payer: MEDICAID

## 2025-07-07 DIAGNOSIS — M25.572 ACUTE LEFT ANKLE PAIN: ICD-10-CM

## 2025-07-07 PROCEDURE — 97110 THERAPEUTIC EXERCISES: CPT | Mod: GP

## 2025-07-07 NOTE — PROGRESS NOTES
"Physical Therapy    Physical Therapy Treatment    Patient Name: Alberto Gerard  MRN: [id number]  Today's Date: 7/7/2025    Time Entry:   Time Calculation  Start Time: 1445  Stop Time: 1515  Time Calculation (min): 30 min     PT Therapeutic Procedures Time Entry  Therapeutic Exercise Time Entry: 30  Insurance: [facility] Community Plan   Visit Limit: APPROVED 10 VISITS, 6/9/25-9/7/25  Visit: 2    Assessment:  PT Assessment  Assessment Comment: Did well with the therapeutic exercise.  Plan:  OP PT Plan  Treatment/Interventions: Cryotherapy, Dry needling, Education/ Instruction, Electrical stimulation, Gait training, Hot pack, Manual therapy, Neuromuscular re-education, Self care/ home management, Therapeutic activities, Therapeutic exercises, Vasopneumatic device  PT Plan: Skilled PT  PT Frequency: 1 time per week  Duration: 10 weeks  Onset Date: 04/21/25  Certification Period Start Date: 06/09/25  Certification Period End Date: 09/07/25  Number of Treatments Authorized: Auth Needed  Rehab Potential: Good  Plan of Care Agreement: Patient    Current Problem  Problem List Items Addressed This Visit    None  Visit Diagnoses         Codes      Acute left ankle pain     M25.572              Subjective:   General  Reason for Referral: left leg pain  Referred By: Pauline Still, THALIA-CNP  Preferred Learning Style: auditory, kinesthetic, verbal, visual, written  General Comment: Patient reports that her knee is doing okay. States her ankle pain is 6/10.    Objective     Treatments:  Bike x 8'  Gastroc stretch 5 x 10\"  Soleus stretch 5 x 10\"  Ankle circles x 10 CW and CCW  Towel scruches x 20  Toe yoga x 10  BAPS pf and df, inv and ev x 30  Seated HS stretch 5 x 10\"  SAQ 2 x 10 x 3\"  SLR 2 x 10 x 3\"  SLR 2 x 5 x 1\"    Goals:  Active       PT Problem       Patient will report compliance with her home exercise program.         Start:  06/09/25    Expected End:  09/07/25            Patient will report improvement via the " LEFS to demonstrate improved activity tolerance.        Start:  06/09/25    Expected End:  09/07/25            Patient will report pain free ankle ROM.        Start:  06/09/25    Expected End:  09/07/25            Patient will demonstrate improvements in left ankle strength to 5/5 to facilitate weight bearing activity.         Start:  06/09/25    Expected End:  09/07/25               PT Problem       Patient will ambulate with a normalized gait pattern.        Start:  06/09/25    Expected End:  09/07/25

## 2025-07-09 DIAGNOSIS — J30.9 ALLERGIC RHINITIS, UNSPECIFIED SEASONALITY, UNSPECIFIED TRIGGER: ICD-10-CM

## 2025-07-09 RX ORDER — FLUTICASONE PROPIONATE 50 MCG
1 SPRAY, SUSPENSION (ML) NASAL DAILY
Qty: 16 G | Refills: 2 | Status: SHIPPED | OUTPATIENT
Start: 2025-07-09

## 2025-07-14 ENCOUNTER — TREATMENT (OUTPATIENT)
Dept: PHYSICAL THERAPY | Facility: CLINIC | Age: 35
End: 2025-07-14
Payer: MEDICAID

## 2025-07-14 DIAGNOSIS — M25.572 ACUTE LEFT ANKLE PAIN: ICD-10-CM

## 2025-07-14 PROCEDURE — 97110 THERAPEUTIC EXERCISES: CPT | Mod: GP,CQ

## 2025-07-14 NOTE — PROGRESS NOTES
"Physical Therapy    Physical Therapy Treatment    Patient Name: Alberto Gerard  MRN: [id number]  Today's Date: 7/14/2025    Time Entry:   Time Calculation  Start Time: 1048  Stop Time: 1121  Time Calculation (min): 33 min     PT Therapeutic Procedures Time Entry  Therapeutic Exercise Time Entry: 33  Insurance: [facility] Community Plan   Visit Limit: APPROVED 10 VISITS, 6/9/25-9/7/25  Visit: 3    Assessment:   Un able to progress rx 2` time constraints.  Pt did best w/ LAQ/SAQ for knee, and reisited green TB for PF for ankle  Plan:   Nu Step as able    Current Problem  Problem List Items Addressed This Visit    None  Visit Diagnoses         Codes      Acute left ankle pain     M25.572              Subjective:    \"Pain in achilles area when standing from prolonged sitting.  Knee is about the same.  I need to  my son at 11:30\"    Pain in L knee 6/10  L ankle 5/10    Objective   Achilles/bottom of heel TTP  Treatments:  L ankle AROM AP's/CW/CCW x 20 ea  Supine gastroc strap stretch 3x30\". L  Supine  HS strap stretch 3x30\". L  Green TB PF x 20  SAQ x 10  QS x 10  LAQ x 20  Towel scruches x 20  Toe yoga x 20  Seated BAPS AP/lateral x 20 ea  Slant board calf stretch 10x10\"      Goals:  Active       PT Problem       Patient will report compliance with her home exercise program.         Start:  06/09/25    Expected End:  09/07/25            Patient will report improvement via the LEFS to demonstrate improved activity tolerance.        Start:  06/09/25    Expected End:  09/07/25            Patient will report pain free ankle ROM.        Start:  06/09/25    Expected End:  09/07/25            Patient will demonstrate improvements in left ankle strength to 5/5 to facilitate weight bearing activity.         Start:  06/09/25    Expected End:  09/07/25               PT Problem       Patient will ambulate with a normalized gait pattern.        Start:  06/09/25    Expected End:  09/07/25              "

## 2025-07-21 ENCOUNTER — TREATMENT (OUTPATIENT)
Dept: PHYSICAL THERAPY | Facility: CLINIC | Age: 35
End: 2025-07-21
Payer: MEDICAID

## 2025-07-21 DIAGNOSIS — M25.572 ACUTE LEFT ANKLE PAIN: ICD-10-CM

## 2025-07-21 PROCEDURE — 97110 THERAPEUTIC EXERCISES: CPT | Mod: GP,CQ

## 2025-07-21 PROCEDURE — 97140 MANUAL THERAPY 1/> REGIONS: CPT | Mod: GP,CQ

## 2025-07-21 NOTE — PROGRESS NOTES
"Physical Therapy    Physical Therapy Treatment    Patient Name: Alberto Gerard  MRN: [id number]  Today's Date: 7/21/2025    Time Entry:   Time Calculation  Start Time: 1205  Stop Time: 1250  Time Calculation (min): 45 min     PT Therapeutic Procedures Time Entry  Therapeutic Exercise Time Entry: 35  Manual Therapy Time Entry: 10  Insurance: [facility] [employer]   Visit Limit: APPROVED 10 VISITS, 6/9/25-9/7/25  Visit: 4    Assessment:  Pt was able to gently progress.  Attempted HS stretch aggravated knee, therefore held today.  Plan:   Nu Step as able    Current Problem  Problem List Items Addressed This Visit    None  Visit Diagnoses         Codes      Acute left ankle pain     M25.572              Subjective:    \"More sore in L ankle today,  I was on the L leg more. \"    Pain in L knee 4/10, 5.5/10 EOS  L ankle 6.5/10, 5/10 EOS    Objective   Achilles/bottom of heel TTP  Treatments:  Gr. 1-2 distraction lateral glides L ankle, great toe distraction x 10 min  Ice massage x 3-4 min  Green TB PF, DF x 20  Supine bilat ankle inversion iso into ball x 10  SAQ  2x10  QS  2x10  LAQ x 20  Towel scrunches x 20  Toe yoga x 20  Teeterboard AP/lateral x 20 ea  Standing unweighted AP's on PD x 20  Slant board calf stretch 10x10\"      Goals:  Active       PT Problem       Patient will report compliance with her home exercise program.         Start:  06/09/25    Expected End:  09/07/25            Patient will report improvement via the LEFS to demonstrate improved activity tolerance.        Start:  06/09/25    Expected End:  09/07/25            Patient will report pain free ankle ROM.        Start:  06/09/25    Expected End:  09/07/25            Patient will demonstrate improvements in left ankle strength to 5/5 to facilitate weight bearing activity.         Start:  06/09/25    Expected End:  09/07/25               PT Problem       Patient will ambulate with a normalized gait pattern.        Start:  06/09/25    Expected End:  " 09/07/25

## 2025-07-28 ENCOUNTER — APPOINTMENT (OUTPATIENT)
Dept: PHYSICAL THERAPY | Facility: CLINIC | Age: 35
End: 2025-07-28
Payer: MEDICAID

## 2025-07-28 ENCOUNTER — DOCUMENTATION (OUTPATIENT)
Dept: PHYSICAL THERAPY | Facility: CLINIC | Age: 35
End: 2025-07-28
Payer: MEDICAID

## 2025-07-28 NOTE — PROGRESS NOTES
Physical Therapy                 Therapy Communication Note    Patient Name: Alberto Gerard  MRN: [id number]  Department:   Room: Room/bed info not found  Today's Date: 7/28/2025     Discipline: Physical Therapy    Missed Visit:       Missed Visit Reason:      Missed Time: Cancel    Comment:

## 2025-08-01 ENCOUNTER — APPOINTMENT (OUTPATIENT)
Dept: NEUROLOGY | Facility: CLINIC | Age: 35
End: 2025-08-01
Payer: MEDICAID

## 2025-08-04 ENCOUNTER — TREATMENT (OUTPATIENT)
Dept: PHYSICAL THERAPY | Facility: CLINIC | Age: 35
End: 2025-08-04
Payer: MEDICAID

## 2025-08-04 DIAGNOSIS — M25.572 ACUTE LEFT ANKLE PAIN: ICD-10-CM

## 2025-08-04 PROCEDURE — 97140 MANUAL THERAPY 1/> REGIONS: CPT | Mod: GP,CQ

## 2025-08-04 PROCEDURE — 97110 THERAPEUTIC EXERCISES: CPT | Mod: GP,CQ

## 2025-08-04 NOTE — PROGRESS NOTES
"Physical Therapy    Physical Therapy Treatment    Patient Name: Alberto Gerard  MRN: [id number]  Today's Date: 8/4/2025    Time Entry:   Time Calculation  Start Time: 0203  Stop Time: 0319  Time Calculation (min): 76 min     PT Therapeutic Procedures Time Entry  Therapeutic Exercise Time Entry: 45  Manual Therapy Time Entry: 10  Insurance: [facility] [employer]   Visit Limit: APPROVED 10 VISITS, 6/9/25-9/7/25  Visit: 5    Assessment:  SLR aggravated L knee today.  Advised pt to try to get some exercise in everday.  Plan:   Nu Step as able    Current Problem  Problem List Items Addressed This Visit    None  Visit Diagnoses         Codes      Acute left ankle pain     M25.572              Subjective:    \"HEP 2x/week, work wasn't too bad last night.  The knee is better overall, but the ankle is about the same.  Going down stairs\"    3-4/10 L knee, 5/10 L ankle    Objective   Achilles/bottom of heel TTP  Treatments:  Gr. 1-2 distraction lateral glides L ankle, great toe distraction x 10 min  Ice massage x 3-4 min  Blue TB PF, DF x 20  EV x 10, blue, x 10 green  Green TB inv x 20  SLR x 10  SAQ  2x10  QS  2x10  LAQ x 20  Seated HS, 10x10\". L  Towel scrunches x 20  Seated BAPS L2-AP's,  laterals, CW/CCW x 10 ea  Teeterboard AP/lateral/Diagonals x 20 ea  SLS x 5, 10\", L  Slant board gastroc/soleus stretches, 10x10\" ea  Ice wrap to knee/ankle, seated x 10 min EOS  Goals:  Active       PT Problem       Patient will report compliance with her home exercise program.         Start:  06/09/25    Expected End:  09/07/25            Patient will report improvement via the LEFS to demonstrate improved activity tolerance.        Start:  06/09/25    Expected End:  09/07/25            Patient will report pain free ankle ROM.        Start:  06/09/25    Expected End:  09/07/25            Patient will demonstrate improvements in left ankle strength to 5/5 to facilitate weight bearing activity.         Start:  06/09/25    Expected End:  " 09/07/25               PT Problem       Patient will ambulate with a normalized gait pattern.        Start:  06/09/25    Expected End:  09/07/25

## 2025-08-11 ENCOUNTER — APPOINTMENT (OUTPATIENT)
Dept: PHYSICAL THERAPY | Facility: CLINIC | Age: 35
End: 2025-08-11
Payer: MEDICAID

## 2025-08-13 ENCOUNTER — TREATMENT (OUTPATIENT)
Dept: PHYSICAL THERAPY | Facility: CLINIC | Age: 35
End: 2025-08-13
Payer: MEDICAID

## 2025-08-13 DIAGNOSIS — M25.572 ACUTE LEFT ANKLE PAIN: ICD-10-CM

## 2025-08-13 PROCEDURE — 97110 THERAPEUTIC EXERCISES: CPT | Mod: GP

## 2025-08-20 ENCOUNTER — OFFICE VISIT (OUTPATIENT)
Facility: CLINIC | Age: 35
End: 2025-08-20
Payer: MEDICAID

## 2025-08-20 VITALS
DIASTOLIC BLOOD PRESSURE: 88 MMHG | SYSTOLIC BLOOD PRESSURE: 128 MMHG | BODY MASS INDEX: 37.17 KG/M2 | WEIGHT: 209.8 LBS | HEART RATE: 80 BPM | HEIGHT: 63 IN

## 2025-08-20 DIAGNOSIS — R73.01 IFG (IMPAIRED FASTING GLUCOSE): Primary | ICD-10-CM

## 2025-08-20 DIAGNOSIS — E11.65 TYPE 2 DIABETES MELLITUS WITH HYPERGLYCEMIA, UNSPECIFIED WHETHER LONG TERM INSULIN USE (MULTI): ICD-10-CM

## 2025-08-20 LAB — POC HEMOGLOBIN A1C: 5.8 % (ref 4.2–6.5)

## 2025-08-20 PROCEDURE — 99213 OFFICE O/P EST LOW 20 MIN: CPT | Performed by: INTERNAL MEDICINE

## 2025-08-20 PROCEDURE — 83036 HEMOGLOBIN GLYCOSYLATED A1C: CPT | Mod: QW | Performed by: INTERNAL MEDICINE

## 2025-08-20 PROCEDURE — 3008F BODY MASS INDEX DOCD: CPT | Performed by: INTERNAL MEDICINE

## 2025-08-20 PROCEDURE — 3044F HG A1C LEVEL LT 7.0%: CPT | Performed by: INTERNAL MEDICINE

## 2025-08-20 PROCEDURE — 1036F TOBACCO NON-USER: CPT | Performed by: INTERNAL MEDICINE

## 2025-08-20 PROCEDURE — 99203 OFFICE O/P NEW LOW 30 MIN: CPT | Performed by: INTERNAL MEDICINE

## 2025-08-20 PROCEDURE — 3079F DIAST BP 80-89 MM HG: CPT | Performed by: INTERNAL MEDICINE

## 2025-08-20 PROCEDURE — 3074F SYST BP LT 130 MM HG: CPT | Performed by: INTERNAL MEDICINE

## 2025-08-20 RX ORDER — TRANEXAMIC ACID 650 MG/1
TABLET ORAL
COMMUNITY

## 2025-08-20 RX ORDER — DEXTROAMPHETAMINE SACCHARATE, AMPHETAMINE ASPARTATE MONOHYDRATE, DEXTROAMPHETAMINE SULFATE AND AMPHETAMINE SULFATE 2.5; 2.5; 2.5; 2.5 MG/1; MG/1; MG/1; MG/1
1 CAPSULE, EXTENDED RELEASE ORAL
COMMUNITY
Start: 2025-08-12

## 2025-08-20 RX ORDER — DULAGLUTIDE 0.75 MG/.5ML
0.75 INJECTION, SOLUTION SUBCUTANEOUS
Qty: 2 ML | Refills: 0 | Status: SHIPPED | OUTPATIENT
Start: 2025-08-24

## 2025-08-20 RX ORDER — CLINDAMYCIN PHOSPHATE 11.9 MG/ML
SOLUTION TOPICAL EVERY 12 HOURS
COMMUNITY
Start: 2025-06-30

## 2025-08-20 RX ORDER — DULAGLUTIDE 1.5 MG/.5ML
1.5 INJECTION, SOLUTION SUBCUTANEOUS
Qty: 2 ML | Refills: 5 | Status: SHIPPED | OUTPATIENT
Start: 2025-08-24

## 2025-08-20 RX ORDER — IBUPROFEN 600 MG/1
1 TABLET, FILM COATED ORAL EVERY 6 HOURS PRN
COMMUNITY
Start: 2025-07-02

## 2025-08-20 ASSESSMENT — ENCOUNTER SYMPTOMS
OCCASIONAL FEELINGS OF UNSTEADINESS: 0
DEPRESSION: 0
LOSS OF SENSATION IN FEET: 0

## 2025-08-20 ASSESSMENT — PATIENT HEALTH QUESTIONNAIRE - PHQ9
SUM OF ALL RESPONSES TO PHQ9 QUESTIONS 1 & 2: 0
2. FEELING DOWN, DEPRESSED OR HOPELESS: NOT AT ALL
1. LITTLE INTEREST OR PLEASURE IN DOING THINGS: NOT AT ALL

## 2025-08-20 ASSESSMENT — PAIN SCALES - GENERAL: PAINLEVEL_OUTOF10: 6

## 2025-08-29 ENCOUNTER — APPOINTMENT (OUTPATIENT)
Dept: PRIMARY CARE | Facility: CLINIC | Age: 35
End: 2025-08-29
Payer: MEDICAID